# Patient Record
Sex: FEMALE | Race: WHITE | NOT HISPANIC OR LATINO | Employment: OTHER | URBAN - METROPOLITAN AREA
[De-identification: names, ages, dates, MRNs, and addresses within clinical notes are randomized per-mention and may not be internally consistent; named-entity substitution may affect disease eponyms.]

---

## 2017-01-09 ENCOUNTER — HOSPITAL ENCOUNTER (OUTPATIENT)
Dept: RADIOLOGY | Facility: HOSPITAL | Age: 82
Discharge: HOME | End: 2017-01-09
Attending: INTERNAL MEDICINE
Payer: MEDICARE

## 2017-01-09 DIAGNOSIS — R93.89 ABNORMAL CHEST X-RAY: ICD-10-CM

## 2017-01-09 PROCEDURE — 71250 CT THORAX DX C-: CPT | Mod: TC

## 2017-01-09 PROCEDURE — 71250 CT THORAX DX C-: CPT | Mod: 26,,, | Performed by: RADIOLOGY

## 2017-01-17 RX ORDER — TIOTROPIUM BROMIDE 18 UG/1
18 CAPSULE ORAL; RESPIRATORY (INHALATION) DAILY
Qty: 90 CAPSULE | Refills: 3 | Status: SHIPPED | OUTPATIENT
Start: 2017-01-17

## 2017-01-17 RX ORDER — FLUTICASONE PROPIONATE AND SALMETEROL 100; 50 UG/1; UG/1
1 POWDER RESPIRATORY (INHALATION) 2 TIMES DAILY
Qty: 180 EACH | Refills: 3 | Status: SHIPPED | OUTPATIENT
Start: 2017-01-17

## 2017-01-20 ENCOUNTER — DOCUMENTATION ONLY (OUTPATIENT)
Dept: FAMILY MEDICINE | Facility: CLINIC | Age: 82
End: 2017-01-20

## 2017-01-20 NOTE — PROGRESS NOTES
Pre-Visit Chart Review  For Appointment Scheduled on 1/23/17.    Health Maintenance Due   Topic Date Due    TETANUS VACCINE  01/11/1950    DEXA SCAN  01/11/1972    Zoster Vaccine  01/11/1992

## 2017-01-23 ENCOUNTER — OFFICE VISIT (OUTPATIENT)
Dept: FAMILY MEDICINE | Facility: CLINIC | Age: 82
End: 2017-01-23
Payer: MEDICARE

## 2017-01-23 VITALS
DIASTOLIC BLOOD PRESSURE: 75 MMHG | TEMPERATURE: 98 F | BODY MASS INDEX: 16.68 KG/M2 | HEIGHT: 64 IN | WEIGHT: 97.69 LBS | HEART RATE: 97 BPM | SYSTOLIC BLOOD PRESSURE: 110 MMHG

## 2017-01-23 DIAGNOSIS — R68.89 COLD INTOLERANCE: ICD-10-CM

## 2017-01-23 DIAGNOSIS — E78.2 HYPERLIPIDEMIA, MIXED: Primary | ICD-10-CM

## 2017-01-23 DIAGNOSIS — R73.9 HYPERGLYCEMIA: ICD-10-CM

## 2017-01-23 DIAGNOSIS — R63.6 UNDERWEIGHT: ICD-10-CM

## 2017-01-23 DIAGNOSIS — E61.1 LOW IRON: ICD-10-CM

## 2017-01-23 PROCEDURE — 99999 PR PBB SHADOW E&M-EST. PATIENT-LVL IV: CPT | Mod: PBBFAC,,, | Performed by: FAMILY MEDICINE

## 2017-01-23 PROCEDURE — 99214 OFFICE O/P EST MOD 30 MIN: CPT | Mod: PBBFAC,PO | Performed by: FAMILY MEDICINE

## 2017-01-23 PROCEDURE — 99214 OFFICE O/P EST MOD 30 MIN: CPT | Mod: S$PBB,,, | Performed by: FAMILY MEDICINE

## 2017-01-23 RX ORDER — FERROUS SULFATE 325(65) MG
325 TABLET ORAL DAILY
COMMUNITY

## 2017-01-23 RX ORDER — MOMETASONE FUROATE 50 UG/1
2 SPRAY, METERED NASAL DAILY PRN
Qty: 51 G | Refills: 3 | Status: SHIPPED | OUTPATIENT
Start: 2017-01-23

## 2017-01-23 RX ORDER — ATORVASTATIN CALCIUM 10 MG/1
TABLET, FILM COATED ORAL
Qty: 90 TABLET | Refills: 3 | Status: SHIPPED | OUTPATIENT
Start: 2017-01-23

## 2017-01-23 RX ORDER — ASCORBIC ACID 500 MG
500 TABLET ORAL DAILY
COMMUNITY

## 2017-01-23 RX ORDER — TRIAMCINOLONE ACETONIDE 1 MG/G
OINTMENT TOPICAL 2 TIMES DAILY
Qty: 30 G | Refills: 3 | Status: SHIPPED | OUTPATIENT
Start: 2017-01-23 | End: 2017-11-10

## 2017-01-23 NOTE — PROGRESS NOTES
CHIEF COMPLAINT:  Follow up      HISTORY OF PRESENT ILLNESS:  Lola Frazier is a 85 y.o. female who presents to clinic for follow up    1.  Hyperlipidemia: on lipitor. She denies any myalgia, dark colored urine. She is due for labs.     2. She has noticed cold intolerance.  She has low iron and is on iron supplements.     3. She has evidence of hyperglycemia, is due for repeat labs.    4. For the last month she has noticed dry itchy skin over the dorsal surface of her left hand. She has been applying hydrocortisone with some improvement.     REVIEW OF SYSTEMS:  The patient denies any fever, chills, night sweats, headaches, vision changes, difficulty speaking or swallowing, decreased hearing, weight loss, weight gain, chest pain, palpitations, shortness of breath, cough, nausea, vomiting, abdominal pain, dysuria, diarrhea, constipation, hematuria, hematochezia, melena, changes in her hair,nails, numbness or weakness in her extremities, erythema, pain or swelling over any of her joints, myalgias, swollen glands, easy bruising, fatigue, edema, symptoms of anxiety or depression.       MEDICATIONS:   Reviewed and/or reconciled in EPIC    ALLERGIES:  Reviewed and/or reconciled in [facility]    PAST MEDICAL/SURGICAL HISTORY:   Past Medical History   Diagnosis Date    Blindness of left eye     Cataract     COPD (chronic obstructive pulmonary disease)     Glaucoma       Past Surgical History   Procedure Laterality Date    Cataract extraction Bilateral     Hysterectomy         FAMILY HISTORY:    Family History   Problem Relation Age of Onset    No Known Problems Mother     Heart attack Father     Cancer Neg Hx     Heart disease Neg Hx        SOCIAL HISTORY:    Social History     Social History    Marital status:      Spouse name: N/A    Number of children: N/A    Years of education: N/A     Occupational History    Not on file.     Social History Main Topics    Smoking status: Current Every Day Smoker      "Packs/day: 0.25     Years: 66.00     Types: Cigarettes    Smokeless tobacco: Never Used      Comment: down to 2 cigatettes a day (8/22/16)    Alcohol use No    Drug use: No    Sexual activity: Not Currently     Partners: Male     Other Topics Concern    Not on file     Social History Narrative       PHYSICAL EXAM:  VITAL SIGNS:   Vitals:    01/23/17 1055   BP: 110/75   Pulse: 97   Temp: 97.7 °F (36.5 °C)   TempSrc: Oral   Weight: 44.3 kg (97 lb 10.6 oz)   Height: 5' 4" (1.626 m)     GENERAL:  Patient appears well nourished, sitting on exam table, in no acute distress.  HEENT:  Atraumatic, normocephalic, PERRLA, EOMI, no conjunctival injection, sclerae are anicteric, normal external auditory canals,TMs clear b/l, gross hearing intact to whisper, MMM, no oropharygneal erythema or exudate.  NECK:  Supple, normal ROM, trachea is midline , no supraclavicular or cervical LAD or masses palpated.  CARDIOVASCULAR:  RRR, normal S1 and S2, no m/r/g.  RESPIRATORY:  CTA b/l, no wheezes, rhonchi, rales.  No increased work of breathing, no  use of accessory muscles.  ABDOMEN:  Soft, nontender, nondistended, normoactive bowel sounds in all four quadrants, no rebound or guarding, no HSM or masses palpated.  Normal percussion.  EXTREMITIES:  2+ DP pulses b/l, no edema.  SKIN:  Warm, no lesions on exposed skin.  NEUROMUSCULAR:  Cranial nerves II-XII grossly intact.   No clubbing or cyanosis of digits/nails.  Steady gait.  PSYCH:  Patient is alert and oriented to person, time, place. They are appropriately dressed and groomed. There is normal eye contact. Rate and tone of speech is normal. Normal insight, judgement. Normal thought content and process.       ASSESSMENT/PLAN: This is a 85 y.o. female who presents to clinic to establish care  1. Hyperlipidemia: CMP, lipid panel  2. Low iron : CBC, iron, TIBC, ferritin  3. Cold intolerance: TSH, CBC, iron studies  4. Hyperglycemia: FBG, Hga1c  5. Underweight: see below  6. ichy skin: " place on triamcinolone ointment BID    Patient readiness: acceptance and barriers:none    During the course of the visit the patient was educated and counseled about the following:     Underweight:  Nutritional supplements and Follow up and re-weight in: 6  months and as needed.     Goals: Underweight: Increase calorie intake and BMI      Did patient meet goals/outcomes: No    The following self management tools provided: declined    Patient Instructions (the written plan) was given to the patient/family.     Time spent with patient: 30 minutes      FOLLOW UP:  6 months      Yara Meléndez MD

## 2017-01-23 NOTE — MR AVS SNAPSHOT
[facility]  [address] [place] [address]  Phone: [phone]  Fax: [phone]                  Lola COLBY Will   2017 10:40 AM   Office Visit    Description:  Female : 1932   Provider:  Yara Meléndez MD   Department:  [facility]           Reason for Visit     Follow-up           Diagnoses this Visit        Comments    Hyperlipidemia, mixed    -  Primary     Adult BMI <19 kg/sq m         Underweight         Hyperglycemia         Cold intolerance         Low iron                To Do List           Future Appointments        Provider Department Dept Phone    2017 8:45 AM LAB [facility] - Lab [phone]    2017 1:20 PM Yara Meléndez MD [facility] [phone]      Goals (5 Years of Data)     None      Follow-Up and Disposition     Return in about 6 months (around 2017) for hyperlipidemia.    Follow-up and Disposition History       These Medications        Disp Refills Start End    atorvastatin (LIPITOR) 10 MG tablet 90 tablet 3 2017     take 1 tablet by mouth once daily    Pharmacy: [facility]  [place]  [address]  [facility] Ph #: [phone]       mometasone (NASONEX) 50 mcg/actuation nasal spray 51 g 3 2017     2 sprays by Nasal route daily as needed. - Nasal    Pharmacy: [facility] [place]  [address]  [facility] Ph #: [phone]       triamcinolone acetonide 0.1% (KENALOG) 0.1 % ointment 30 g 3 2017    Apply topically 2 (two) times daily. - Topical (Top)    Pharmacy: [facility] [place] [place]   [address]  [facility] Ph #: [phone]         [facility]     [facility] On Call Nurse Care Line -  Assistance  Registered nurses in the [facility] On Call Center provide clinical advisement, health education, appointment booking, and other advisory services.  Call for this free service at  [phone].             Medications           Message regarding Medications     Verify the changes and/or additions to your medication regime listed below are the same as discussed with your clinician today.  If any of these changes or additions are incorrect, please notify your healthcare provider.        START taking these NEW medications        Refills    triamcinolone acetonide 0.1% (KENALOG) 0.1 % ointment 3    Sig: Apply topically 2 (two) times daily.    Class: Print    Route: Topical (Top)      CHANGE how you are taking these medications     Start Taking Instead of    mometasone (NASONEX) 50 mcg/actuation nasal spray mometasone (NASONEX) 50 mcg/actuation nasal spray    Dosage:  2 sprays by Nasal route daily as needed. Dosage:  2 sprays by Nasal route once daily.    Reason for Change:  Reorder            Verify that the below list of medications is an accurate representation of the medications you are currently taking.  If none reported, the list may be blank. If incorrect, please contact your healthcare provider. Carry this list with you in case of emergency.           Current Medications     albuterol (ACCUNEB) 0.63 mg/3 mL Nebu Take 3 mLs (0.63 mg total) by nebulization every 6 (six) hours as needed.    ascorbic acid, vitamin C, (VITAMIN C) 500 MG tablet Take 500 mg by mouth once daily.    aspirin 81 MG Chew Take 1 tablet (81 mg total) by mouth once daily.    atorvastatin (LIPITOR) 10 MG tablet take 1 tablet by mouth once daily    b complex vitamins tablet Take 1 tablet by mouth once daily.    calcium carbonate (OS-TANYA) 600 mg (1,500 mg) Tab Take 600 mg by mouth once daily.    ferrous sulfate 325 mg (65 mg iron) Tab tablet Take 325 mg by mouth once daily.    fish oil-omega-3 fatty acids 300-1,000 mg capsule Take 2 g by mouth once daily.    fluticasone-salmeterol 100-50 mcg/dose (ADVAIR) 100-50 mcg/dose diskus inhaler Inhale 1 puff into the lungs 2 (two) times daily.    glucosamine-chondroitin 500-400 mg  "tablet Take 1 tablet by mouth 2 (two) times daily.     ipratropium (ATROVENT) 0.02 % nebulizer solution Take 2.5 mLs (500 mcg total) by nebulization 3 (three) times daily as needed for Wheezing.    LUTEIN ORAL Take 1 tablet by mouth once daily.    mometasone (NASONEX) 50 mcg/actuation nasal spray 2 sprays by Nasal route daily as needed.    SIMBRINZA 1-0.2 % DrpS     tafluprost, PF, 0.0015 % Dpet Apply to eye.    tiotropium (SPIRIVA) 18 mcg inhalation capsule Inhale 1 capsule (18 mcg total) into the lungs once daily.    VIT A/VIT C/VIT E/ZINC/COPPER (PRESERVISION AREDS ORAL) Take 1 tablet by mouth 2 (two) times daily.     triamcinolone acetonide 0.1% (KENALOG) 0.1 % ointment Apply topically 2 (two) times daily.           Clinical Reference Information           Vital Signs - Last Recorded  Most recent update: 1/23/2017 11:01 AM by Aliza Herrera MA    BP Pulse Temp Ht Wt BMI    110/75 97 97.7 °F (36.5 °C) (Oral) 5' 4" (1.626 m) 44.3 kg (97 lb 10.6 oz) 16.76 kg/m2      Blood Pressure          Most Recent Value    BP  110/75      Allergies as of 1/23/2017     Iodine And Iodide Containing Products      Immunizations Administered on Date of Encounter - 1/23/2017     None      Orders Placed During Today's Visit     Future Labs/Procedures Expected by Expires    CBC auto differential  1/23/2017 3/24/2018    Comprehensive metabolic panel  1/23/2017 3/24/2018    Ferritin  1/23/2017 1/23/2018    Hemoglobin A1c  1/23/2017 3/24/2018    Iron and TIBC  1/23/2017 1/23/2018    Lipid panel  1/23/2017 3/24/2018    TSH  1/23/2017 1/23/2018      Smoking Cessation     If you would like to quit smoking:   You may be eligible for free services if you are a [place] resident and started smoking cigarettes before September 1, 1988.  Call the Smoking Cessation [employer] ([facility]) toll free at (992) [phone] or ([phone].   Call 0-800-QUIT-NOW if you do not meet the above criteria.            "

## 2017-01-26 ENCOUNTER — LAB VISIT (OUTPATIENT)
Dept: LAB | Facility: HOSPITAL | Age: 82
End: 2017-01-26
Attending: FAMILY MEDICINE
Payer: MEDICARE

## 2017-01-26 DIAGNOSIS — N28.9 DECREASED RENAL FUNCTION: ICD-10-CM

## 2017-01-26 DIAGNOSIS — R91.8 OPACITY OF LUNG ON IMAGING STUDY: ICD-10-CM

## 2017-01-26 LAB
ANION GAP SERPL CALC-SCNC: 14 MMOL/L
BUN SERPL-MCNC: 20 MG/DL
CALCIUM SERPL-MCNC: 10.3 MG/DL
CHLORIDE SERPL-SCNC: 104 MMOL/L
CO2 SERPL-SCNC: 26 MMOL/L
CREAT SERPL-MCNC: 0.8 MG/DL
CREAT SERPL-MCNC: 0.8 MG/DL
EST. GFR  (AFRICAN AMERICAN): >60 ML/MIN/1.73 M^2
EST. GFR  (AFRICAN AMERICAN): >60 ML/MIN/1.73 M^2
EST. GFR  (NON AFRICAN AMERICAN): >60 ML/MIN/1.73 M^2
EST. GFR  (NON AFRICAN AMERICAN): >60 ML/MIN/1.73 M^2
GLUCOSE SERPL-MCNC: 87 MG/DL
POTASSIUM SERPL-SCNC: 4.1 MMOL/L
SODIUM SERPL-SCNC: 144 MMOL/L

## 2017-01-26 PROCEDURE — 80048 BASIC METABOLIC PNL TOTAL CA: CPT

## 2017-01-26 PROCEDURE — 36415 COLL VENOUS BLD VENIPUNCTURE: CPT | Mod: PO

## 2017-02-01 ENCOUNTER — TELEPHONE (OUTPATIENT)
Dept: FAMILY MEDICINE | Facility: CLINIC | Age: 82
End: 2017-02-01

## 2017-02-01 NOTE — TELEPHONE ENCOUNTER
Whoever schduled this patient's labs scheduled open labs from October that didn't need to be done instead of scheduling ronnie labs i ordered at her visit on 1/23.  She needs those labs scheduled and they need to be fasting.

## 2017-02-03 NOTE — TELEPHONE ENCOUNTER
Spoke to patient son and states he will have patient call on Monday 2/6/17 to schedule labs attempted to call patient phone ring busy and could not leave message on cell

## 2017-07-21 ENCOUNTER — DOCUMENTATION ONLY (OUTPATIENT)
Dept: FAMILY MEDICINE | Facility: CLINIC | Age: 82
End: 2017-07-21

## 2017-07-21 NOTE — PROGRESS NOTES
Pre-Visit Chart Review  For Appointment Scheduled on 7/24/17.    Health Maintenance Due   Topic Date Due    DEXA SCAN  01/11/1972    Zoster Vaccine  01/11/1992

## 2017-11-07 DIAGNOSIS — Z78.0 ASYMPTOMATIC MENOPAUSAL STATE: Primary | ICD-10-CM

## 2017-11-08 ENCOUNTER — TELEPHONE (OUTPATIENT)
Dept: FAMILY MEDICINE | Facility: CLINIC | Age: 82
End: 2017-11-08

## 2017-11-08 ENCOUNTER — DOCUMENTATION ONLY (OUTPATIENT)
Dept: FAMILY MEDICINE | Facility: CLINIC | Age: 82
End: 2017-11-08

## 2017-11-08 NOTE — TELEPHONE ENCOUNTER
Attempted to call patient's son x2 regarding appointment on 11/10/17 patient needs to go to Er  For eval and treatment

## 2017-11-08 NOTE — PROGRESS NOTES
Pre-Visit Chart Review  For Appointment Scheduled on 11/10/17.    Health Maintenance Due   Topic Date Due    DEXA SCAN  01/11/1972    Zoster Vaccine  01/11/1992    Influenza Vaccine  08/01/2017    Lipid Panel  08/03/2017

## 2017-11-10 ENCOUNTER — HOSPITAL ENCOUNTER (INPATIENT)
Facility: HOSPITAL | Age: 82
LOS: 4 days | Discharge: HOSPICE | DRG: 640 | End: 2017-11-14
Attending: EMERGENCY MEDICINE | Admitting: HOSPITALIST
Payer: MEDICARE

## 2017-11-10 ENCOUNTER — OFFICE VISIT (OUTPATIENT)
Dept: FAMILY MEDICINE | Facility: CLINIC | Age: 82
End: 2017-11-10
Payer: MEDICARE

## 2017-11-10 VITALS
SYSTOLIC BLOOD PRESSURE: 81 MMHG | WEIGHT: 84.19 LBS | TEMPERATURE: 98 F | HEIGHT: 64 IN | BODY MASS INDEX: 14.37 KG/M2 | DIASTOLIC BLOOD PRESSURE: 59 MMHG | RESPIRATION RATE: 28 BRPM | HEART RATE: 121 BPM | OXYGEN SATURATION: 86 %

## 2017-11-10 DIAGNOSIS — E86.0 DEHYDRATION: ICD-10-CM

## 2017-11-10 DIAGNOSIS — R00.0 TACHYCARDIA: ICD-10-CM

## 2017-11-10 DIAGNOSIS — R09.02 HYPOXIA: Primary | ICD-10-CM

## 2017-11-10 DIAGNOSIS — E86.0 DEHYDRATION: Primary | ICD-10-CM

## 2017-11-10 DIAGNOSIS — R06.02 SOB (SHORTNESS OF BREATH): ICD-10-CM

## 2017-11-10 DIAGNOSIS — I95.9 HYPOTENSION: ICD-10-CM

## 2017-11-10 DIAGNOSIS — R91.8 MASS OF RIGHT LUNG: ICD-10-CM

## 2017-11-10 DIAGNOSIS — E83.52 HYPERCALCEMIA: ICD-10-CM

## 2017-11-10 DIAGNOSIS — J44.9 CHRONIC OBSTRUCTIVE PULMONARY DISEASE, UNSPECIFIED COPD TYPE: ICD-10-CM

## 2017-11-10 DIAGNOSIS — R09.02 HYPOXIA: ICD-10-CM

## 2017-11-10 PROBLEM — E43 SEVERE MALNUTRITION: Status: ACTIVE | Noted: 2017-11-10

## 2017-11-10 LAB
ALBUMIN SERPL BCP-MCNC: 2.7 G/DL
ALP SERPL-CCNC: 109 U/L
ALT SERPL W/O P-5'-P-CCNC: 7 U/L
ANION GAP SERPL CALC-SCNC: 12 MMOL/L
AST SERPL-CCNC: 21 U/L
BACTERIA #/AREA URNS HPF: ABNORMAL /HPF
BASOPHILS # BLD AUTO: 0 K/UL
BASOPHILS NFR BLD: 0.2 %
BILIRUB SERPL-MCNC: 0.9 MG/DL
BILIRUB UR QL STRIP: NEGATIVE
BUN SERPL-MCNC: 24 MG/DL
CALCIUM SERPL-MCNC: 12.2 MG/DL
CAOX CRY URNS QL MICRO: ABNORMAL
CHLORIDE SERPL-SCNC: 99 MMOL/L
CLARITY UR: ABNORMAL
CO2 SERPL-SCNC: 29 MMOL/L
COLOR UR: YELLOW
CREAT SERPL-MCNC: 0.9 MG/DL
DIFFERENTIAL METHOD: ABNORMAL
EOSINOPHIL # BLD AUTO: 0 K/UL
EOSINOPHIL NFR BLD: 0.3 %
ERYTHROCYTE [DISTWIDTH] IN BLOOD BY AUTOMATED COUNT: 14.1 %
EST. GFR  (AFRICAN AMERICAN): >60 ML/MIN/1.73 M^2
EST. GFR  (NON AFRICAN AMERICAN): 58 ML/MIN/1.73 M^2
GLUCOSE SERPL-MCNC: 124 MG/DL
GLUCOSE UR QL STRIP: NEGATIVE
HCT VFR BLD AUTO: 40.5 %
HGB BLD-MCNC: 13.3 G/DL
HGB UR QL STRIP: NEGATIVE
KETONES UR QL STRIP: ABNORMAL
LEUKOCYTE ESTERASE UR QL STRIP: ABNORMAL
LYMPHOCYTES # BLD AUTO: 1 K/UL
LYMPHOCYTES NFR BLD: 8.9 %
MCH RBC QN AUTO: 31.3 PG
MCHC RBC AUTO-ENTMCNC: 32.7 G/DL
MCV RBC AUTO: 96 FL
MICROSCOPIC COMMENT: ABNORMAL
MONOCYTES # BLD AUTO: 0.7 K/UL
MONOCYTES NFR BLD: 6 %
NEUTROPHILS # BLD AUTO: 10 K/UL
NEUTROPHILS NFR BLD: 84.6 %
NITRITE UR QL STRIP: NEGATIVE
PH UR STRIP: 6 [PH] (ref 5–8)
PLATELET # BLD AUTO: 229 K/UL
PMV BLD AUTO: 10.3 FL
POTASSIUM SERPL-SCNC: 3.5 MMOL/L
PROT SERPL-MCNC: 7.8 G/DL
PROT UR QL STRIP: NEGATIVE
RBC # BLD AUTO: 4.24 M/UL
RBC #/AREA URNS HPF: 3 /HPF (ref 0–4)
SODIUM SERPL-SCNC: 140 MMOL/L
SP GR UR STRIP: >=1.03 (ref 1–1.03)
SQUAMOUS #/AREA URNS HPF: 42 /HPF
URN SPEC COLLECT METH UR: ABNORMAL
UROBILINOGEN UR STRIP-ACNC: 1 EU/DL
WBC # BLD AUTO: 11.8 K/UL
WBC #/AREA URNS HPF: 22 /HPF (ref 0–5)

## 2017-11-10 PROCEDURE — 25000003 PHARM REV CODE 250: Performed by: EMERGENCY MEDICINE

## 2017-11-10 PROCEDURE — 94640 AIRWAY INHALATION TREATMENT: CPT

## 2017-11-10 PROCEDURE — 99284 EMERGENCY DEPT VISIT MOD MDM: CPT | Mod: 25,27

## 2017-11-10 PROCEDURE — 12000002 HC ACUTE/MED SURGE SEMI-PRIVATE ROOM

## 2017-11-10 PROCEDURE — 96361 HYDRATE IV INFUSION ADD-ON: CPT

## 2017-11-10 PROCEDURE — 25000003 PHARM REV CODE 250: Performed by: HOSPITALIST

## 2017-11-10 PROCEDURE — 85025 COMPLETE CBC W/AUTO DIFF WBC: CPT

## 2017-11-10 PROCEDURE — 99214 OFFICE O/P EST MOD 30 MIN: CPT | Mod: S$PBB,,, | Performed by: FAMILY MEDICINE

## 2017-11-10 PROCEDURE — 99999 PR PBB SHADOW E&M-EST. PATIENT-LVL IV: CPT | Mod: PBBFAC,,, | Performed by: FAMILY MEDICINE

## 2017-11-10 PROCEDURE — 36415 COLL VENOUS BLD VENIPUNCTURE: CPT

## 2017-11-10 PROCEDURE — 94761 N-INVAS EAR/PLS OXIMETRY MLT: CPT

## 2017-11-10 PROCEDURE — 96360 HYDRATION IV INFUSION INIT: CPT

## 2017-11-10 PROCEDURE — 99214 OFFICE O/P EST MOD 30 MIN: CPT | Mod: PBBFAC,25,PO | Performed by: FAMILY MEDICINE

## 2017-11-10 PROCEDURE — 80053 COMPREHEN METABOLIC PANEL: CPT

## 2017-11-10 PROCEDURE — 81000 URINALYSIS NONAUTO W/SCOPE: CPT

## 2017-11-10 PROCEDURE — 25000242 PHARM REV CODE 250 ALT 637 W/ HCPCS: Performed by: HOSPITALIST

## 2017-11-10 PROCEDURE — 63600175 PHARM REV CODE 636 W HCPCS: Performed by: NURSE PRACTITIONER

## 2017-11-10 PROCEDURE — 25000003 PHARM REV CODE 250: Performed by: NURSE PRACTITIONER

## 2017-11-10 RX ORDER — TIOTROPIUM BROMIDE 18 UG/1
18 CAPSULE ORAL; RESPIRATORY (INHALATION) DAILY
Status: DISCONTINUED | OUTPATIENT
Start: 2017-11-11 | End: 2017-11-10 | Stop reason: CLARIF

## 2017-11-10 RX ORDER — NAPROXEN SODIUM 220 MG/1
81 TABLET, FILM COATED ORAL DAILY
Status: DISCONTINUED | OUTPATIENT
Start: 2017-11-11 | End: 2017-11-14 | Stop reason: HOSPADM

## 2017-11-10 RX ORDER — FERROUS SULFATE 325(65) MG
325 TABLET, DELAYED RELEASE (ENTERIC COATED) ORAL DAILY
Status: DISCONTINUED | OUTPATIENT
Start: 2017-11-11 | End: 2017-11-14 | Stop reason: HOSPADM

## 2017-11-10 RX ORDER — IPRATROPIUM BROMIDE AND ALBUTEROL SULFATE 2.5; .5 MG/3ML; MG/3ML
3 SOLUTION RESPIRATORY (INHALATION) EVERY 6 HOURS PRN
Status: DISCONTINUED | OUTPATIENT
Start: 2017-11-10 | End: 2017-11-14 | Stop reason: HOSPADM

## 2017-11-10 RX ORDER — SODIUM CHLORIDE 9 MG/ML
INJECTION, SOLUTION INTRAVENOUS CONTINUOUS
Status: DISCONTINUED | OUTPATIENT
Start: 2017-11-10 | End: 2017-11-12

## 2017-11-10 RX ORDER — IPRATROPIUM BROMIDE 0.5 MG/2.5ML
0.5 SOLUTION RESPIRATORY (INHALATION) EVERY 6 HOURS
Status: DISCONTINUED | OUTPATIENT
Start: 2017-11-10 | End: 2017-11-14 | Stop reason: HOSPADM

## 2017-11-10 RX ORDER — FLUTICASONE FUROATE AND VILANTEROL 100; 25 UG/1; UG/1
1 POWDER RESPIRATORY (INHALATION) DAILY
Status: DISCONTINUED | OUTPATIENT
Start: 2017-11-11 | End: 2017-11-14 | Stop reason: HOSPADM

## 2017-11-10 RX ADMIN — IPRATROPIUM BROMIDE 0.5 MG: 0.5 SOLUTION RESPIRATORY (INHALATION) at 07:11

## 2017-11-10 RX ADMIN — SODIUM CHLORIDE: 0.9 INJECTION, SOLUTION INTRAVENOUS at 01:11

## 2017-11-10 RX ADMIN — PAMIDRONATE DISODIUM 90 MG: 9 INJECTION, SOLUTION INTRAVENOUS at 11:11

## 2017-11-10 RX ADMIN — SODIUM CHLORIDE: 0.9 INJECTION, SOLUTION INTRAVENOUS at 03:11

## 2017-11-10 RX ADMIN — SODIUM CHLORIDE 1000 ML: 0.9 INJECTION, SOLUTION INTRAVENOUS at 12:11

## 2017-11-10 RX ADMIN — SODIUM CHLORIDE: 0.9 INJECTION, SOLUTION INTRAVENOUS at 11:11

## 2017-11-10 NOTE — PROGRESS NOTES
Pt arrived to the floor from the ER. No acute distress noted and VS stable. MD aware of pt's arrival. Pt oriented to the room and call light in reach. Will continue to monitor.

## 2017-11-10 NOTE — PLAN OF CARE
Cm entered pt's room with son (Rob) and daughter-in-law (Ana) at bedside and they helped completed the assessment.  Pt came from Dr. Meléndez's office.  Pt lives alone in an apartment. PCP is Dr. Meléndez.  Pt denies diabetes, dialysis and coumadin.  Pt has home O2 and nebulizer at home.  Insurance verified as Medicare and  for life.  Disposition:  Pt will discharge to home. Pt can benefit with HH/PT.       11/10/17 8955   Discharge Assessment   Assessment Type Discharge Planning Assessment   Confirmed/corrected address and phone number on facesheet? Yes   Assessment information obtained from? Patient   Prior to hospitilization cognitive status: Alert/Oriented   Prior to hospitalization functional status: Independent;Assistive Equipment   Current cognitive status: Alert/Oriented   Current Functional Status: Independent;Assistive Equipment   Facility Arrived From: Dr. Meléndez's office   Lives With alone   Able to Return to Prior Arrangements yes   Is patient able to care for self after discharge? Yes   Who are your caregiver(s) and their phone number(s)? SonCandy Wtit - [phone]   Patient's perception of discharge disposition home or selfcare   Readmission Within The Last 30 Days no previous admission in last 30 days   Patient currently being followed by outpatient case management? No   Patient currently receives any other outside agency services? No   Equipment Currently Used at Home rollator;oxygen;respiratory supplies;nebulizer;grab bar;cane, quad   Do you have any problems affording any of your prescribed medications? No   Is the patient taking medications as prescribed? yes  ([facility])   Does the patient have transportation home? Yes   Transportation Available family or friend will provide   Dialysis Name and Scheduled days no   Does the patient receive services at the Coumadin Clinic? No   Discharge Plan A Home;Home Health   Discharge Plan B Home;Home Health   Patient/Family  In Agreement With Plan yes

## 2017-11-10 NOTE — PLAN OF CARE
Cm attempted to complete the assessment.  Nurse in the room doing assessment.       11/10/17 1522   Discharge Assessment   Assessment Type Discharge Planning Assessment

## 2017-11-10 NOTE — ED PROVIDER NOTES
"Encounter Date: 11/10/2017    SCRIBE #1 NOTE: ITanika, am scribing for, and in the presence of, Dr. Vivas.       History     Chief Complaint   Patient presents with    Shortness of Breath     Gradually worsening x 2 weeks.     11/10/2017  12:08 PM     Chief Complaint: SOB    The patient is a 85 y.o. female with PMHx of COPD, cataract, glaucoma and left ey blindness who presents with shortness of breath. Patient c/o gradual onset of progressively worsening shortness of breath for the past 4 weeks. Pt has associated mild dry cough which is chronic and decreased appetite. Pt states she does not want to eat and only intakes fluids. No recent fevers, cp, abdominal pain, vomiting or diarrhea. The patient had decreased oxygen saturation in the office and was hypoxia in ED triage. She was sent from Dr. Meléndez for workup and possible admission. Patient has reportedly lost 20 pounds in the past year. Shx of hysterectomy and cataract extraction.      The history is provided by the patient and the spouse.     Review of patient's allergies indicates:   Allergen Reactions    Iodine and iodide containing products Anaphylaxis     " I die"     Past Medical History:   Diagnosis Date    Blindness of left eye     Cataract     COPD (chronic obstructive pulmonary disease)     Glaucoma      Past Surgical History:   Procedure Laterality Date    CATARACT EXTRACTION Bilateral     HYSTERECTOMY       Family History   Problem Relation Age of Onset    No Known Problems Mother     Heart attack Father     Cancer Neg Hx     Heart disease Neg Hx      Social History   Substance Use Topics    Smoking status: Former Smoker     Packs/day: 0.25     Years: 66.00     Types: Cigarettes     Quit date: 10/10/2017    Smokeless tobacco: Never Used      Comment: down to 2 cigatettes a day (8/22/16)    Alcohol use No     Review of Systems   Constitutional: Positive for appetite change and unexpected weight change. Negative for chills " and fever.   HENT: Negative for congestion, rhinorrhea and sore throat.    Respiratory: Positive for cough and shortness of breath.    Cardiovascular: Negative for chest pain.   Gastrointestinal: Negative for abdominal pain, diarrhea, nausea and vomiting.   Genitourinary: Negative for dysuria.   Musculoskeletal: Negative for back pain and myalgias.   Skin: Negative for rash.   Neurological: Negative for weakness and numbness.   Hematological: Does not bruise/bleed easily.   All other systems reviewed and are negative.      Physical Exam     Initial Vitals   BP Pulse Resp Temp SpO2   11/10/17 1150 11/10/17 1150 11/10/17 1150 11/10/17 1150 11/10/17 1153   (!) 89/50 88 14 97.5 °F (36.4 °C) 96 %      MAP       11/10/17 1150       63         Physical Exam    Nursing note and vitals reviewed.  HENT:   Head: Normocephalic and atraumatic.   Mouth/Throat: Mucous membranes are dry (extremely).   Cardiovascular: Regular rhythm, normal heart sounds, intact distal pulses and normal pulses. Tachycardia present.  Exam reveals no gallop and no friction rub.    No murmur heard.  Pulses:       Radial pulses are 2+ on the right side, and 2+ on the left side.        Dorsalis pedis pulses are 2+ on the right side, and 2+ on the left side.        Posterior tibial pulses are 2+ on the right side, and 2+ on the left side.   Pulmonary/Chest: She has no wheezes. She has no rhonchi. She has no rales.   Diminished breath sounds.   Abdominal: Soft. She exhibits no distension. There is no tenderness.   Neurological: She is alert and oriented to person, place, and time.   Skin: Skin is dry. No rash noted.   Psychiatric: She has a normal mood and affect.         ED Course   Procedures  Labs Reviewed - No data to display          Medical Decision Making:   Initial Assessment:   85-year-old female presented with a chief complaint of shortness of breath.  Differential Diagnosis:   Initial differential diagnosis included but not limited to Pneumonia,  sepsis, dehydration, lung cancer.  Clinical Tests:   Lab Tests: Ordered and Reviewed  Radiological Study: Ordered and Reviewed  ED Management:  The patient was emergently evaluated in the emergency Department, her evaluation was significant for an elderly female with extremely dry mucous membranes.  The patient appears dehydrated.  The patient's labs were significant for hypercalcemia.  The patient's x-ray does show worsening of her right lung mass.  The patient's diagnosis is dehydration, hypercalcemia, and likely lung cancer.  I will admit her to the hospitalist service for further care and treatment.  I've discussed the case with the hospitalist on-call, Dr. Sousa.  He has accepted the patient for admission.  The patient was treated in the emergency Department with an IV fluid bolus.               I, Dr. Tyrone Vivas, personally performed the services described in this documentation. All medical record entries made by the scribe were at my direction and in my presence.  I have reviewed the chart and agree that the record reflects my personal performance and is accurate and complete. Tyrone Viavs MD.  5:10 PM 11/10/2017       ED Course      Clinical Impression:   The primary encounter diagnosis was Hypoxia. Diagnoses of SOB (shortness of breath), Dehydration, and Hypercalcemia were also pertinent to this visit.                           Tyrone Vivas MD  11/10/17 1681

## 2017-11-11 ENCOUNTER — OUTSIDE PLACE OF SERVICE (OUTPATIENT)
Dept: PULMONOLOGY | Facility: CLINIC | Age: 82
End: 2017-11-11
Payer: MEDICARE

## 2017-11-11 LAB
ANION GAP SERPL CALC-SCNC: 10 MMOL/L
BUN SERPL-MCNC: 14 MG/DL
CALCIUM SERPL-MCNC: 10.5 MG/DL
CHLORIDE SERPL-SCNC: 105 MMOL/L
CO2 SERPL-SCNC: 28 MMOL/L
CREAT SERPL-MCNC: 0.7 MG/DL
EST. GFR  (AFRICAN AMERICAN): >60 ML/MIN/1.73 M^2
EST. GFR  (NON AFRICAN AMERICAN): >60 ML/MIN/1.73 M^2
GLUCOSE SERPL-MCNC: 80 MG/DL
POTASSIUM SERPL-SCNC: 3.8 MMOL/L
PTH-INTACT SERPL-MCNC: 12.9 PG/ML
SODIUM SERPL-SCNC: 143 MMOL/L

## 2017-11-11 PROCEDURE — 25000242 PHARM REV CODE 250 ALT 637 W/ HCPCS: Performed by: HOSPITALIST

## 2017-11-11 PROCEDURE — 36415 COLL VENOUS BLD VENIPUNCTURE: CPT

## 2017-11-11 PROCEDURE — 94761 N-INVAS EAR/PLS OXIMETRY MLT: CPT

## 2017-11-11 PROCEDURE — 94640 AIRWAY INHALATION TREATMENT: CPT

## 2017-11-11 PROCEDURE — 12000002 HC ACUTE/MED SURGE SEMI-PRIVATE ROOM

## 2017-11-11 PROCEDURE — 82397 CHEMILUMINESCENT ASSAY: CPT

## 2017-11-11 PROCEDURE — 25000003 PHARM REV CODE 250: Performed by: HOSPITALIST

## 2017-11-11 PROCEDURE — 97802 MEDICAL NUTRITION INDIV IN: CPT

## 2017-11-11 PROCEDURE — 99222 1ST HOSP IP/OBS MODERATE 55: CPT | Mod: ,,, | Performed by: INTERNAL MEDICINE

## 2017-11-11 PROCEDURE — 25000003 PHARM REV CODE 250: Performed by: INTERNAL MEDICINE

## 2017-11-11 PROCEDURE — 80048 BASIC METABOLIC PNL TOTAL CA: CPT

## 2017-11-11 PROCEDURE — 83970 ASSAY OF PARATHORMONE: CPT

## 2017-11-11 RX ADMIN — ASPIRIN 81 MG CHEWABLE TABLET 81 MG: 81 TABLET CHEWABLE at 08:11

## 2017-11-11 RX ADMIN — IPRATROPIUM BROMIDE 0.5 MG: 0.5 SOLUTION RESPIRATORY (INHALATION) at 07:11

## 2017-11-11 RX ADMIN — SODIUM CHLORIDE 250 ML: 0.9 INJECTION, SOLUTION INTRAVENOUS at 08:11

## 2017-11-11 RX ADMIN — IPRATROPIUM BROMIDE 0.5 MG: 0.5 SOLUTION RESPIRATORY (INHALATION) at 01:11

## 2017-11-11 RX ADMIN — FLUTICASONE FUROATE AND VILANTEROL TRIFENATATE 1 PUFF: 100; 25 POWDER RESPIRATORY (INHALATION) at 07:11

## 2017-11-11 RX ADMIN — FERROUS SULFATE TAB EC 325 MG (65 MG FE EQUIVALENT) 325 MG: 325 (65 FE) TABLET DELAYED RESPONSE at 08:11

## 2017-11-11 RX ADMIN — IPRATROPIUM BROMIDE 0.5 MG: 0.5 SOLUTION RESPIRATORY (INHALATION) at 12:11

## 2017-11-11 NOTE — PLAN OF CARE
11/10/17 1949   Patient Assessment/Suction   Level of Consciousness (AVPU) alert   Respiratory Effort Normal;Unlabored   Expansion/Accessory Muscles/Retractions no use of accessory muscles   All Lung Fields Breath Sounds diminished   PRE-TX-O2-ETCO2   O2 Device (Oxygen Therapy) room air   SpO2 96 %   Pulse Oximetry Type Intermittent   $ Pulse Oximetry - Multiple Charge Pulse Oximetry - Multiple   Pulse 79   Resp 16   Aerosol Therapy   $ Aerosol Therapy Charges Aerosol Treatment   Respiratory Treatment Status given   SVN/Inhaler Treatment Route mask   Position During Treatment HOB at 45 degrees   Patient Tolerance good   Post-Treatment   Post-treatment Heart Rate (beats/min) 84   Post-treatment Resp Rate (breaths/min) 16   All Fields Breath Sounds aeration increased   Pt assessed, no distress noted. Pt receives Atrovent Q6, tols txs well.

## 2017-11-11 NOTE — PLAN OF CARE
Problem: Patient Care Overview  Goal: Plan of Care Review  Outcome: Ongoing (interventions implemented as appropriate)  No significant events noted. Pt up to bedside commode with standby assist. Bed alarm utilized. Tolerating diet but only eating ~25% of meals, likes boost though. Teds/scds on pt. IV fluids infusing. Calcium stable today. Safety maintained, no falls/ injury.

## 2017-11-11 NOTE — ASSESSMENT & PLAN NOTE
Related to (etiology):   Social/Environmental     Signs and Symptoms (as evidenced by):   Decreased PO intake >2months, wtloss of 14% [id number], BMI 14, 70% IBW, dehydration, anorexia/depression     Interventions/Recommendations (treatment strategy):  Regular diet with Dary Plus TID  Strong Encouragement PO Intake  Daily MVI  May consider Appetite Stimulant or PPN supplementation if PO intake continues <50%  See RD Note 11/11/17    Nutrition Diagnosis Status:   New

## 2017-11-11 NOTE — CONSULTS
Pulmonary/Critical Care Consult      Patient name: Lola Frazier  MRN: [id number]  Date: 11/11/2017    Admit Date: 11/10/2017  Consult Requested By: Luiz Masters MD    Reason for Consult: Lung cancer    HPI:    86 yo female, very poor historian, who doesn't know why she is in the hospital.  I was asked to see for lung cancer.  Pt does state that she has a lung mass ( seen a MD in [place] for this and refused any evaluation).  Admitted with generalized weakness and hypercalcemia.  ROS as below.  H/PEx reviewed.    Review of Systems    Review of Systems   Constitutional: Positive for malaise/fatigue and weight loss.   Respiratory: Positive for cough and shortness of breath.    Neurological: Positive for weakness.   Psychiatric/Behavioral: Positive for depression.        Decreased activity   All other systems reviewed and are negative.      Past Medical History    Past Medical History:   Diagnosis Date    Blindness of left eye     Cataract     COPD (chronic obstructive pulmonary disease)     Glaucoma     Hyperlipemia        Past Surgical History    Past Surgical History:   Procedure Laterality Date    CATARACT EXTRACTION Bilateral     HYSTERECTOMY         Medications (scheduled):      aspirin  81 mg Oral Daily    ferrous sulfate  325 mg Oral Daily    fluticasone-vilanterol  1 puff Inhalation Daily    ipratropium  0.5 mg Nebulization Q6H       Medications (infusions):      sodium chloride 0.9% 150 mL/hr at 11/10/17 2315       Medications (prn):     albuterol-ipratropium 2.5mg-0.5mg/3mL    Family History:   Family History   Problem Relation Age of Onset    No Known Problems Mother     Heart attack Father     Cancer Neg Hx     Heart disease Neg Hx        Social History: Tobacco:   History   Smoking Status    Former Smoker    Packs/day: 0.25    Years: 66.00    Types: Cigarettes    Quit date: 10/10/2017   Smokeless Tobacco    Never Used     Comment: down to 2 cigatettes a day (8/22/16)                "                 EtOH:   History   Alcohol Use No                                Drugs:   History   Drug Use No                                Occupation: retired                             Asbestos exposure: no    Physical Exam    Vital signs:  Temp:  [96.5 °F (35.8 °C)-98 °F (36.7 °C)]   Pulse:  []   Resp:  [14-28]   BP: ()/(50-67)   SpO2:  [86 %-100 %]     Intake/Output:   Intake/Output Summary (Last 24 hours) at 11/11/17 1006  Last data filed at 11/11/17 0600   Gross per 24 hour   Intake          4133.75 ml   Output                0 ml   Net          4133.75 ml        BMI: Estimated body mass index is 14.42 kg/m² as calculated from the following:    Height as of this encounter: 5' 4" (1.626 m).    Weight as of this encounter: 38.1 kg (84 lb).    Physical Exam   Constitutional: No distress.   Thin frail, confused   HENT:   Head: Normocephalic and atraumatic.   Temporal wasting   Eyes: Pupils are equal, round, and reactive to light.   Neck: Normal range of motion. Neck supple. No JVD present. No tracheal deviation present. No thyromegaly present.   Cardiovascular: Normal rate and regular rhythm.  Exam reveals no gallop and no friction rub.    No murmur heard.  Pulmonary/Chest: Effort normal and breath sounds normal. No stridor. No respiratory distress. She has no wheezes. She has no rales.   Shallow effort   Abdominal: Soft. Bowel sounds are normal. She exhibits no distension. There is no tenderness. There is no rebound.   scaphoid   Musculoskeletal: Normal range of motion. She exhibits no edema.   Neurological: She is alert.   Skin: Skin is warm. She is not diaphoretic.   Vitals reviewed.      Laboratory      Recent Labs  Lab 11/10/17  1223   WBC 11.80   RBC 4.24   HGB 13.3   HCT 40.5      MCV 96   MCH 31.3*   MCHC 32.7         Recent Labs  Lab 11/10/17  1223 11/11/17  0549   CALCIUM 12.2* 10.5   PROT 7.8  --     143   K 3.5 3.8   CO2 29 28   CL 99 105   BUN 24* 14   CREATININE 0.9 0.7 "   ALKPHOS 109  --    ALT 7*  --    AST 21  --    BILITOT 0.9  --        No results for input(s): INR, APTT in the last 24 hours.    Invalid input(s): PT    No results for input(s): CPK, CPKMB, TROPONINI, MB in the last 24 hours.    Additional labs: reviewed    Microbiology:       Microbiology Results (last 7 days)     ** No results found for the last 168 hours. **          Radiology    Imaging Results          CT Chest Without Contrast (In process)                X-Ray Chest 1 View (Final result)  Result time 11/10/17 12:35:42    Final result by Noah Castillo MD (11/10/17 12:35:42)                 Impression:      1.  Interval further increase in size of large right mid lung zone when compared to the prior studies, most likely representing a malignant mass such as a bronchogenic carcinoma. Probable right hilar lymphadenopathy.    2. Patchy linear and nodular opacities are also noted in each lung which are most compatible with indolent infection such as TRENTON, as noted previously.    Findings discussed with Dr. Vivas at 12:35 hours on 11/10/17.        Electronically signed by: Noah Castillo MD  Date:     11/10/17  Time:    12:35              Narrative:    Comparison: 11/9/17 and prior    Technique: Single AP portable chest radiograph.    Findings:There has been a further interval increase in size of right midlung zone mass when compared to CT  radiograph dated 1/9/17, most likely representing a malignant mass such as bronchogenic carcinoma. This measures up to approximately 7 cm transverse. There is pulmonary emphysema. Additional nodular opacities are identified in each upper and lower lung which most likely represent indolent infection given prior CT findings. No pneumothorax is seen. There is right hilar fullness which has increased compared to the 10/24/16 radiographs marked with an arrow.    No acute osseous abnormality is seen. There are healed rib fractures in the left upper posterior chest.                              RADIOLOGY REPORT (Final result)  Result time 11/11/17 09:22:06                Additional Studies    na    Ventilator Information              No results for input(s): PH, PCO2, PO2, HCO3, POCSATURATED, BE in the last 24 hours.    Impression/Plan      ICD-10-CM ICD-9-CM   1. Hypoxia R09.02 799.02   2. SOB (shortness of breath) R06.02 786.05   3. Dehydration E86.0 276.51   4. Hypercalcemia E83.52 275.42     Lung cancer  - pt with large mass which is almost certainly a lung cancer  - she is too frail to consider bronch or biopsy without significant risk  - her performance status is too poor to consider chemotherapy  - only possible treatment would be XRT  - realistically hospice is probably the most appropriate course  - agree with DNR  Hypoxemia  - better this AM  - continue with O2 as needed  H/o cigarette use  - aware  Probable COPD  - respiratory treatments  Hypercalcemia  - better today with rehydration  - may be related to underlying cancer  Dehydration  - better  Malnutrition  - needs dietary supplements      Thank you for this consult.  I will follow with you while the patient is hospitalized.  Please call ([phone]) if you have any questions.    Deon Jiménez MD

## 2017-11-11 NOTE — PLAN OF CARE
Problem: Nutrition, Imbalanced: Inadequate Oral Intake (Adult)  Intervention: Promote/Optimize Nutrition  Goals: increase PO intake to >50% of all meals and supplements and maintain wt by discharge  Nutrition Goal Status: new  Communication of RD Recs:  (POC reviewed)    Nutrition Discharge Planning: to be determined    Continuum of Care Plan Referral to Outpatient Services: home care

## 2017-11-11 NOTE — ASSESSMENT & PLAN NOTE
Possibly symptomatic.  Due to malignancy, dehydration, and Ca supplement.  Run saline at 150 ml/h.  Zometa 4 mg IV x 1.  Monitor Ca level.  PTH and PTHrp.

## 2017-11-11 NOTE — HPI
Late last year, she underwent imaging of her chest, which showed a spiculated mass in her right lung.  At the time, she didn't want any further testing or biopsy.  Didn't want any treatment for it.  Lives by herself.  Family brought her to Dr. Meléndez today.  For the past month or two, she's anorexic, sleeping all the time, has no energy, and is depressed.  Finds no marilu in things she used to like to do, such as sewing.  Has lost a lot of weight.  In [facility] office, patient was hypotensive and hypoxic.  Was referred to our ER.  No fevers or chills.  Gets short of breath with exertion.  Has chronic pains in her back.

## 2017-11-11 NOTE — H&P
"[facility] Hospital Medicine  History & Physical    Patient Name: Lola Frazier  MRN: [id number]  Admission Date: 11/10/2017  Attending Physician: Theodore Sousa MD   Primary Care Provider: Yara Meléndez MD         Patient information was obtained from patient, relative(s), past medical records and ER records.     Subjective:     Principal Problem:Hypercalcemia    Chief Complaint:   Chief Complaint   Patient presents with    Shortness of Breath     Gradually worsening x 2 weeks.        HPI: Late last year, she underwent imaging of her chest, which showed a spiculated mass in her right lung.  At the time, she didn't want any further testing or biopsy.  Didn't want any treatment for it.  Lives by herself.  Family brought her to Dr. Meléndez today.  For the past month or two, she's anorexic, sleeping all the time, has no energy, and is depressed.  Finds no marilu in things she used to like to do, such as sewing.  Has lost a lot of weight.  In [facility] office, patient was hypotensive and hypoxic.  Was referred to our ER.  No fevers or chills.  Gets short of breath with exertion.  Has chronic pains in her back.    Past Medical History:   Diagnosis Date    Blindness of left eye     Cataract     COPD (chronic obstructive pulmonary disease)     Glaucoma     Hyperlipemia        Past Surgical History:   Procedure Laterality Date    CATARACT EXTRACTION Bilateral     HYSTERECTOMY         Review of patient's allergies indicates:   Allergen Reactions    Iodine and iodide containing products Anaphylaxis     " I die"       No current facility-administered medications on file prior to encounter.      Current Outpatient Prescriptions on File Prior to Encounter   Medication Sig    albuterol (ACCUNEB) 0.63 mg/3 mL Nebu Take 3 mLs (0.63 mg total) by nebulization every 6 (six) hours as needed.    ascorbic acid, vitamin C, (VITAMIN C) 500 MG tablet Take 500 mg by mouth once daily.    aspirin 81 MG Chew " Take 1 tablet (81 mg total) by mouth once daily.    atorvastatin (LIPITOR) 10 MG tablet take 1 tablet by mouth once daily    b complex vitamins tablet Take 1 tablet by mouth once daily.    calcium carbonate (OS-TANYA) 600 mg (1,500 mg) Tab Take 600 mg by mouth once daily.    ferrous sulfate 325 mg (65 mg iron) Tab tablet Take 325 mg by mouth once daily.    fish oil-omega-3 fatty acids 300-1,000 mg capsule Take 2 g by mouth once daily.    fluticasone-salmeterol 100-50 mcg/dose (ADVAIR) 100-50 mcg/dose diskus inhaler Inhale 1 puff into the lungs 2 (two) times daily.    glucosamine-chondroitin 500-400 mg tablet Take 1 tablet by mouth 2 (two) times daily.     ipratropium (ATROVENT) 0.02 % nebulizer solution Take 2.5 mLs (500 mcg total) by nebulization 3 (three) times daily as needed for Wheezing.    LUTEIN ORAL Take 1 tablet by mouth once daily.    mometasone (NASONEX) 50 mcg/actuation nasal spray 2 sprays by Nasal route daily as needed.    SIMBRINZA 1-0.2 % DrpS     tafluprost, PF, 0.0015 % Dpet Apply to eye.    tiotropium (SPIRIVA) 18 mcg inhalation capsule Inhale 1 capsule (18 mcg total) into the lungs once daily.    VIT A/VIT C/VIT E/ZINC/COPPER (PRESERVISION AREDS ORAL) Take 1 tablet by mouth 2 (two) times daily.     [DISCONTINUED] triamcinolone acetonide 0.1% (KENALOG) 0.1 % ointment Apply topically 2 (two) times daily.     Family History     Problem Relation (Age of Onset)    Heart attack Father    No Known Problems Mother        Social History Main Topics    Smoking status: Former Smoker     Packs/day: 0.25     Years: 66.00     Types: Cigarettes     Quit date: 10/10/2017    Smokeless tobacco: Never Used      Comment: down to 2 cigatettes a day (8/22/16)    Alcohol use No    Drug use: No    Sexual activity: Not Currently     Partners: Male     Review of Systems   Constitutional: Positive for activity change, appetite change and fatigue. Negative for chills and fever.   Respiratory: Positive for  shortness of breath. Negative for cough.    Cardiovascular: Negative for chest pain and leg swelling.   Gastrointestinal: Negative for abdominal pain, nausea and vomiting.   Endocrine: Negative for cold intolerance.   Genitourinary: Negative for difficulty urinating and dysuria.   Musculoskeletal: Positive for back pain.   Neurological: Negative for tremors, seizures and light-headedness.   Psychiatric/Behavioral: Positive for confusion and dysphoric mood. Negative for hallucinations. The patient is not nervous/anxious.    All other systems reviewed and are negative.    Objective:     Vital Signs (Most Recent):  Temp: 97.4 °F (36.3 °C) (11/10/17 1540)  Pulse: 86 (11/10/17 1540)  Resp: 16 (11/10/17 1540)  BP: 107/66 (11/10/17 1540)  SpO2: 98 % (11/10/17 1555) Vital Signs (24h Range):  Temp:  [97.4 °F (36.3 °C)-97.5 °F (36.4 °C)] 97.4 °F (36.3 °C)  Pulse:  [] 86  Resp:  [14-28] 16  SpO2:  [86 %-100 %] 98 %  BP: ()/(50-67) 107/66     Weight: 38.1 kg (84 lb)  Body mass index is 14.42 kg/m².    Physical Exam   Constitutional: She is oriented to person, place, and time. Vital signs are normal. She appears cachectic. She is active. She has a sickly appearance.   HENT:   Head: Normocephalic and atraumatic.   Right Ear: External ear normal.   Left Ear: External ear normal.   Mouth/Throat: Mucous membranes are dry.   Eyes: Conjunctivae and EOM are normal.   Neck: Trachea normal and full passive range of motion without pain. Neck supple. No JVD present. No thyromegaly present.   Cardiovascular: Normal rate and regular rhythm.  PMI is not displaced.    No murmur heard.  Pulmonary/Chest: Effort normal and breath sounds normal. She exhibits no mass and no tenderness.   Abdominal: Soft. Normal appearance and bowel sounds are normal. There is no hepatosplenomegaly. There is no tenderness.   Musculoskeletal: Normal range of motion. She exhibits no edema.   Neurological: She is alert and oriented to person, place, and  time. She has normal strength. No cranial nerve deficit.   Skin: Skin is warm and dry. Capillary refill takes less than 2 seconds. No rash noted.   Psychiatric: She has a normal mood and affect. Her speech is normal. She is slowed. Cognition and memory are impaired.        Significant Labs:   BMP:   Recent Labs  Lab 11/10/17  1223   *      K 3.5   CL 99   CO2 29   BUN 24*   CREATININE 0.9   CALCIUM 12.2*     CBC:   Recent Labs  Lab 11/10/17  1223   WBC 11.80   HGB 13.3   HCT 40.5          Significant Imaging: I have reviewed all pertinent imaging results/findings within the past 24 hours.    Assessment/Plan:     * Hypercalcemia    Possibly symptomatic.  Due to malignancy, dehydration, and Ca supplement.  Run saline at 150 ml/h.  Zometa 4 mg IV x 1.  Monitor Ca level.  PTH and PTHrp.          Mass of right lung    CT chest.  Consult with pulmonologist.  Patient is not a candidate for systemic treatment.  Not a candidate for resection.          Severe malnutrition    Due to poor intake and malignancy.  Ensure meal replacement TID.  Consult dietician.            VTE Risk Mitigation         Ordered     Medium Risk of VTE  Once      11/10/17 1448     Place RADHA hose  Until discontinued      11/10/17 1448     Place sequential compression device  Until discontinued      11/10/17 1448         I spent 15 minutes of face to face discussion regarding advance directives and end of life planning which included patient and family. Patient and Family understand the seriousness of her condition and would like to make their end of life decisions known as follows-  Do not resuscitate or intubate.    Theodore Sousa MD  Department of Hospital Medicine   [facility]

## 2017-11-11 NOTE — CONSULTS
[facility]  Adult Nutrition  Consult Note    SUMMARY     Recommendations    Recommendation/Intervention: Continue Regular diet with Boost Plus as tolerated with strong encouragement of all intake. Consider adding daily MVI. Consider possible appetite stimulant or PPN supplementation if PO intake is continuously <50%. Site RD to monitor  Goals: increase PO intake to >50% of all meals and supplements and maintain wt by discharge  Nutrition Goal Status: new  Communication of RD Recs:  (POC reviewed)    Nutrition Discharge Planning: to be determined    Continuum of Care Plan    Referral to Outpatient Services: home care    Reason for Assessment    Reason for Assessment: physician consult  Diagnosis: other (see comments) (hypercalcemia)  Relevent Medical History: COPD          General Information Comments: Remote assessment completed. Pt found to have mass in right lung. Pt refusing further treatment at this time. Pt lives by herself with home O2, + wtloss of unknown amount, per H&P, pt with cachectic appearance    Nutrition Prescription Ordered    Current Diet Order: Regular  Nutrition Order Comments: no intake recorded     Oral Nutrition Supplement: Boost Plus TID     Evaluation of Received Nutrients/Fluid Intake    IV Fluid (mL): 3600     I/O: +4,133.8      Fluid Required: exceed needs     % Intake of Estimated Energy Needs: Other: Unknown  % Meal Intake: Other: Unknown     Nutrition Risk Screen     Nutrition Risk Screen: no indicators present    Nutrition/Diet History    Patient Reported Diet/Restrictions/Preferences: general     Food Preferences: no cultural or Ep[employer]opalian preferences        Factors Affecting Nutritional Intake: depression    Labs/Tests/Procedures/Meds    Diagnostic Test/Procedure Review: reviewed  Pertinent Labs Reviewed: reviewed     Pertinent Medications Reviewed: reviewed  Pertinent Medications Comments: Aspirin, Iron    Physical Findings    Overall Physical Appearance: weak,  "underweight (cachectic per H&P)  Tubes:  (-)  Oral/Mouth Cavity:  (UTO)  Skin: intact (per H&P)    Anthropometrics    Temp: 98 °F (36.7 °C)     Height: 5' 4"  Weight Method:  (RD reviewed)  Weight: 38.1 kg (83 lb 15.9 oz)     Ideal Body Weight (IBW), Female: 120 lb     % Ideal Body Weight, Female (lb): 70 lb  BMI (Calculated): 14.4  BMI Grade: less than 16 protein-energy malnutrition grade III  Weight Loss: unintentional     Weight Change Amount: 13 lb 10.7 oz (per chart review- since 1/23/17)    Estimated/Assessed Needs    Weight Used For Calorie Calculations: 38.1 kg (83 lb 15.9 oz) (IBW)   Height (cm): 162.6 cm  Energy Calorie Requirements (kcal): 7585-9949 (35-40)  Energy Need Method: Kcal/kg     35 kcal/kg (kcal): 1333.5 and 40 kcal/kg (kcal): 1524   RMR ([place]-St. Jeor Equation): 811        Weight Used For Protein Calculations: 38.1 kg (83 lb 15.9 oz)  Protein Requirements: 45-57 (1.2-1.5)  1.2 gm Protein (gm): 45.82 and 1.5 gm Protein (gm): 57.27     Fluid Need Method: RDA Method (1ml/kcal or per MD)     RDA Method (mL): 1333      Assessment and Plan    Severe malnutrition    Related to (etiology):   Social/Environmental     Signs and Symptoms (as evidenced by):   Decreased PO intake >2months, wtloss of 14% [id number], BMI 14, 70% IBW, dehydration, anorexia/depression     Interventions/Recommendations (treatment strategy):  Regular diet with Dary Plus TID  Strong Encouragement PO Intake  Daily MVI  May consider Appetite Stimulant or PPN supplementation if PO intake continues <50%  See RD Note 11/11/17    Nutrition Diagnosis Status:   New              Monitor and Evaluation    Food and Nutrient Intake: food and beverage intake, energy intake  Food and Nutrient Adminstration: diet order  Knowledge/Beliefs/Attitudes: food and nutrition knowledge/skill, beliefs and attitudes  Physical Activity and Function: nutrition-related ADLs and IADLs  Anthropometric Measurements: weight, weight change  Biochemical Data, " Medical Tests and Procedures: electrolyte and renal panel, gastrointestinal profile, glucose/endocrine profile, inflammatory profile, lipid profile  Nutrition-Focused Physical Findings: overall appearance, head and eyes, extremities, muscles and bones, skin    Nutrition Risk    Level of Risk:  (F/U 2x/wk)    Nutrition Follow-Up    RD Follow-up?: Yes

## 2017-11-11 NOTE — PROGRESS NOTES
10:00 AM  Consult to pulmonology called in to Pulmonologist on call- Dr Jiménez's answering service- spoke w/ Katherine.

## 2017-11-11 NOTE — PLAN OF CARE
Problem: Patient Care Overview  Goal: Plan of Care Review  Outcome: Ongoing (interventions implemented as appropriate)  POC reviewed with patient. Verbalized understanding. Patient had an uneventful night. Patient denies pain during shift. No complaints of SOB.  IV fluids infusing as ordered Md. Patient up to the bedside commode with assistance. Hourly rounding on patient to promote safety. Safety maintained throughout the shift.Patient positions and repositions self independently. No  Skin break down.   Bed locked and in lowest position. Call light in reach. Side rails up x2. NON skid socks on when OOB. Patient remained free of falls/ trauma.  Will continue to monitor.

## 2017-11-11 NOTE — SUBJECTIVE & OBJECTIVE
"Past Medical History:   Diagnosis Date    Blindness of left eye     Cataract     COPD (chronic obstructive pulmonary disease)     Glaucoma     Hyperlipemia        Past Surgical History:   Procedure Laterality Date    CATARACT EXTRACTION Bilateral     HYSTERECTOMY         Review of patient's allergies indicates:   Allergen Reactions    Iodine and iodide containing products Anaphylaxis     " I die"       No current facility-administered medications on file prior to encounter.      Current Outpatient Prescriptions on File Prior to Encounter   Medication Sig    albuterol (ACCUNEB) 0.63 mg/3 mL Nebu Take 3 mLs (0.63 mg total) by nebulization every 6 (six) hours as needed.    ascorbic acid, vitamin C, (VITAMIN C) 500 MG tablet Take 500 mg by mouth once daily.    aspirin 81 MG Chew Take 1 tablet (81 mg total) by mouth once daily.    atorvastatin (LIPITOR) 10 MG tablet take 1 tablet by mouth once daily    b complex vitamins tablet Take 1 tablet by mouth once daily.    calcium carbonate (OS-TANYA) 600 mg (1,500 mg) Tab Take 600 mg by mouth once daily.    ferrous sulfate 325 mg (65 mg iron) Tab tablet Take 325 mg by mouth once daily.    fish oil-omega-3 fatty acids 300-1,000 mg capsule Take 2 g by mouth once daily.    fluticasone-salmeterol 100-50 mcg/dose (ADVAIR) 100-50 mcg/dose diskus inhaler Inhale 1 puff into the lungs 2 (two) times daily.    glucosamine-chondroitin 500-400 mg tablet Take 1 tablet by mouth 2 (two) times daily.     ipratropium (ATROVENT) 0.02 % nebulizer solution Take 2.5 mLs (500 mcg total) by nebulization 3 (three) times daily as needed for Wheezing.    LUTEIN ORAL Take 1 tablet by mouth once daily.    mometasone (NASONEX) 50 mcg/actuation nasal spray 2 sprays by Nasal route daily as needed.    SIMBRINZA 1-0.2 % DrpS     tafluprost, PF, 0.0015 % Dpet Apply to eye.    tiotropium (SPIRIVA) 18 mcg inhalation capsule Inhale 1 capsule (18 mcg total) into the lungs once daily.    VIT " A/VIT C/VIT E/ZINC/COPPER (PRESERVISION AREDS ORAL) Take 1 tablet by mouth 2 (two) times daily.     [DISCONTINUED] triamcinolone acetonide 0.1% (KENALOG) 0.1 % ointment Apply topically 2 (two) times daily.     Family History     Problem Relation (Age of Onset)    Heart attack Father    No Known Problems Mother        Social History Main Topics    Smoking status: Former Smoker     Packs/day: 0.25     Years: 66.00     Types: Cigarettes     Quit date: 10/10/2017    Smokeless tobacco: Never Used      Comment: down to 2 cigatettes a day (8/22/16)    Alcohol use No    Drug use: No    Sexual activity: Not Currently     Partners: Male     Review of Systems   Constitutional: Positive for activity change, appetite change and fatigue. Negative for chills and fever.   Respiratory: Positive for shortness of breath. Negative for cough.    Cardiovascular: Negative for chest pain and leg swelling.   Gastrointestinal: Negative for abdominal pain, nausea and vomiting.   Endocrine: Negative for cold intolerance.   Genitourinary: Negative for difficulty urinating and dysuria.   Musculoskeletal: Positive for back pain.   Neurological: Negative for tremors, seizures and light-headedness.   Psychiatric/Behavioral: Positive for confusion and dysphoric mood. Negative for hallucinations. The patient is not nervous/anxious.    All other systems reviewed and are negative.    Objective:     Vital Signs (Most Recent):  Temp: 97.4 °F (36.3 °C) (11/10/17 1540)  Pulse: 86 (11/10/17 1540)  Resp: 16 (11/10/17 1540)  BP: 107/66 (11/10/17 1540)  SpO2: 98 % (11/10/17 1555) Vital Signs (24h Range):  Temp:  [97.4 °F (36.3 °C)-97.5 °F (36.4 °C)] 97.4 °F (36.3 °C)  Pulse:  [] 86  Resp:  [14-28] 16  SpO2:  [86 %-100 %] 98 %  BP: ()/(50-67) 107/66     Weight: 38.1 kg (84 lb)  Body mass index is 14.42 kg/m².    Physical Exam   Constitutional: She is oriented to person, place, and time. Vital signs are normal. She appears cachectic. She is  active. She has a sickly appearance.   HENT:   Head: Normocephalic and atraumatic.   Right Ear: External ear normal.   Left Ear: External ear normal.   Mouth/Throat: Mucous membranes are dry.   Eyes: Conjunctivae and EOM are normal.   Neck: Trachea normal and full passive range of motion without pain. Neck supple. No JVD present. No thyromegaly present.   Cardiovascular: Normal rate and regular rhythm.  PMI is not displaced.    No murmur heard.  Pulmonary/Chest: Effort normal and breath sounds normal. She exhibits no mass and no tenderness.   Abdominal: Soft. Normal appearance and bowel sounds are normal. There is no hepatosplenomegaly. There is no tenderness.   Musculoskeletal: Normal range of motion. She exhibits no edema.   Neurological: She is alert and oriented to person, place, and time. She has normal strength. No cranial nerve deficit.   Skin: Skin is warm and dry. Capillary refill takes less than 2 seconds. No rash noted.   Psychiatric: She has a normal mood and affect. Her speech is normal. She is slowed. Cognition and memory are impaired.        Significant Labs:   BMP:   Recent Labs  Lab 11/10/17  1223   *      K 3.5   CL 99   CO2 29   BUN 24*   CREATININE 0.9   CALCIUM 12.2*     CBC:   Recent Labs  Lab 11/10/17  1223   WBC 11.80   HGB 13.3   HCT 40.5          Significant Imaging: I have reviewed all pertinent imaging results/findings within the past 24 hours.

## 2017-11-11 NOTE — ASSESSMENT & PLAN NOTE
CT chest.  Consult with pulmonologist.  Patient is not a candidate for systemic treatment.  Not a candidate for resection.

## 2017-11-11 NOTE — PROGRESS NOTES
11/11/17 0742   Patient Assessment/Suction   Level of Consciousness (AVPU) alert   Respiratory Effort Normal   All Lung Fields Breath Sounds clear;diminished   Cough Type good;nonproductive   PRE-TX-O2-ETCO2   O2 Device (Oxygen Therapy) room air   SpO2 97 %   Pulse Oximetry Type Intermittent   $ Pulse Oximetry - Multiple Charge Pulse Oximetry - Multiple   Pulse 79   Resp 18   Aerosol Therapy   $ Aerosol Therapy Charges Aerosol Treatment   Respiratory Treatment Status given   SVN/Inhaler Treatment Route mask   Position During Treatment HOB at 45 degrees   Patient Tolerance good   Inhaler   $ Inhaler Charges MDI (Metered Dose Inahler) Treatment   Respiratory Treatment Status given   SVN/Inhaler Treatment Route mouthpiece   Patient Tolerance good   Post-Treatment   Post-treatment Heart Rate (beats/min) 77   Post-treatment Resp Rate (breaths/min) 18   All Fields Breath Sounds aeration increased   Pt receives atrovent q6, duoneb q6prn, breo qd, no prn tx required, pt currently on RA,  tolerated scheduled tx well, vitals are as charted.

## 2017-11-12 PROBLEM — I95.9 HYPOTENSION: Status: ACTIVE | Noted: 2017-11-12

## 2017-11-12 LAB
ANION GAP SERPL CALC-SCNC: 9 MMOL/L
BASOPHILS # BLD AUTO: 0 K/UL
BASOPHILS NFR BLD: 0.2 %
BUN SERPL-MCNC: 7 MG/DL
CALCIUM SERPL-MCNC: 8.9 MG/DL
CHLORIDE SERPL-SCNC: 103 MMOL/L
CO2 SERPL-SCNC: 28 MMOL/L
CREAT SERPL-MCNC: 0.6 MG/DL
DIFFERENTIAL METHOD: ABNORMAL
EOSINOPHIL # BLD AUTO: 0.1 K/UL
EOSINOPHIL NFR BLD: 1.2 %
ERYTHROCYTE [DISTWIDTH] IN BLOOD BY AUTOMATED COUNT: 13.8 %
EST. GFR  (AFRICAN AMERICAN): >60 ML/MIN/1.73 M^2
EST. GFR  (NON AFRICAN AMERICAN): >60 ML/MIN/1.73 M^2
GLUCOSE SERPL-MCNC: 96 MG/DL
HCT VFR BLD AUTO: 35.8 %
HGB BLD-MCNC: 11.8 G/DL
LYMPHOCYTES # BLD AUTO: 1.1 K/UL
LYMPHOCYTES NFR BLD: 10.1 %
MCH RBC QN AUTO: 31.3 PG
MCHC RBC AUTO-ENTMCNC: 33 G/DL
MCV RBC AUTO: 95 FL
MONOCYTES # BLD AUTO: 0.4 K/UL
MONOCYTES NFR BLD: 3.8 %
NEUTROPHILS # BLD AUTO: 9.6 K/UL
NEUTROPHILS NFR BLD: 84.7 %
PLATELET # BLD AUTO: 189 K/UL
PMV BLD AUTO: 10 FL
POTASSIUM SERPL-SCNC: 2.9 MMOL/L
RBC # BLD AUTO: 3.78 M/UL
SODIUM SERPL-SCNC: 140 MMOL/L
WBC # BLD AUTO: 11.4 K/UL

## 2017-11-12 PROCEDURE — 12000002 HC ACUTE/MED SURGE SEMI-PRIVATE ROOM

## 2017-11-12 PROCEDURE — 25000242 PHARM REV CODE 250 ALT 637 W/ HCPCS: Performed by: HOSPITALIST

## 2017-11-12 PROCEDURE — 25000003 PHARM REV CODE 250: Performed by: HOSPITALIST

## 2017-11-12 PROCEDURE — 93306 TTE W/DOPPLER COMPLETE: CPT

## 2017-11-12 PROCEDURE — 93306 TTE W/DOPPLER COMPLETE: CPT | Mod: 26,,, | Performed by: INTERNAL MEDICINE

## 2017-11-12 PROCEDURE — 80048 BASIC METABOLIC PNL TOTAL CA: CPT

## 2017-11-12 PROCEDURE — 85025 COMPLETE CBC W/AUTO DIFF WBC: CPT

## 2017-11-12 PROCEDURE — 25000003 PHARM REV CODE 250: Performed by: INTERNAL MEDICINE

## 2017-11-12 PROCEDURE — 63600175 PHARM REV CODE 636 W HCPCS: Performed by: INTERNAL MEDICINE

## 2017-11-12 PROCEDURE — 36415 COLL VENOUS BLD VENIPUNCTURE: CPT

## 2017-11-12 PROCEDURE — 99232 SBSQ HOSP IP/OBS MODERATE 35: CPT | Mod: ,,, | Performed by: INTERNAL MEDICINE

## 2017-11-12 PROCEDURE — 94640 AIRWAY INHALATION TREATMENT: CPT

## 2017-11-12 PROCEDURE — 94761 N-INVAS EAR/PLS OXIMETRY MLT: CPT

## 2017-11-12 RX ORDER — POTASSIUM CHLORIDE 7.45 MG/ML
10 INJECTION INTRAVENOUS
Status: DISPENSED | OUTPATIENT
Start: 2017-11-12 | End: 2017-11-12

## 2017-11-12 RX ORDER — POTASSIUM CHLORIDE 20 MEQ/1
60 TABLET, EXTENDED RELEASE ORAL ONCE
Status: COMPLETED | OUTPATIENT
Start: 2017-11-12 | End: 2017-11-12

## 2017-11-12 RX ADMIN — POTASSIUM CHLORIDE 60 MEQ: 1500 TABLET, EXTENDED RELEASE ORAL at 02:11

## 2017-11-12 RX ADMIN — POTASSIUM CHLORIDE 10 MEQ: 10 INJECTION, SOLUTION INTRAVENOUS at 04:11

## 2017-11-12 RX ADMIN — IPRATROPIUM BROMIDE 0.5 MG: 0.5 SOLUTION RESPIRATORY (INHALATION) at 12:11

## 2017-11-12 RX ADMIN — POTASSIUM CHLORIDE 10 MEQ: 10 INJECTION, SOLUTION INTRAVENOUS at 05:11

## 2017-11-12 RX ADMIN — ASPIRIN 81 MG CHEWABLE TABLET 81 MG: 81 TABLET CHEWABLE at 08:11

## 2017-11-12 RX ADMIN — POTASSIUM CHLORIDE 10 MEQ: 10 INJECTION, SOLUTION INTRAVENOUS at 09:11

## 2017-11-12 RX ADMIN — IPRATROPIUM BROMIDE 0.5 MG: 0.5 SOLUTION RESPIRATORY (INHALATION) at 07:11

## 2017-11-12 RX ADMIN — IPRATROPIUM BROMIDE 0.5 MG: 0.5 SOLUTION RESPIRATORY (INHALATION) at 01:11

## 2017-11-12 RX ADMIN — FLUTICASONE FUROATE AND VILANTEROL TRIFENATATE 1 PUFF: 100; 25 POWDER RESPIRATORY (INHALATION) at 08:11

## 2017-11-12 RX ADMIN — FERROUS SULFATE TAB EC 325 MG (65 MG FE EQUIVALENT) 325 MG: 325 (65 FE) TABLET DELAYED RESPONSE at 08:11

## 2017-11-12 RX ADMIN — SODIUM CHLORIDE: 0.9 INJECTION, SOLUTION INTRAVENOUS at 08:11

## 2017-11-12 NOTE — PLAN OF CARE
Problem: Patient Care Overview  Goal: Plan of Care Review  Outcome: Ongoing (interventions implemented as appropriate)  Pt rec neb treatments with atrovent and breo mdi. BS diminished yomi with HR 82 bpm and 95% sats on room air.

## 2017-11-12 NOTE — PROGRESS NOTES
Dr Masters notified of pt's hypotension. HR 80's. Pt is asymptomatic, AAO. Order obtained for 250ml/2hr bolus. Bolus initiated.  Night nurse, MARTIN Dixon aware of this.

## 2017-11-12 NOTE — PLAN OF CARE
Problem: Patient Care Overview  Goal: Plan of Care Review  Outcome: Ongoing (interventions implemented as appropriate)  Patient AAOx4. Patient hypotensive throughout shift.  MD notified at beginning of shift by prior nurse.  Bolus ran as ordered.  IVF infusing.  Patient is asymptomatic and BP has had slight elevation.  RADHA and SCD's on.  Pt free from falls this shift.  Bed in lowest position, bed alarm on, wheels locked, call light in reach.  Will continue to monitor.

## 2017-11-12 NOTE — PROGRESS NOTES
"Progress Note  Pulmonary/Critical Care      Admit Date: 11/10/2017    SUBJECTIVE:     HPI/Interval history (See H&P for complete P,F,SHx) :     Stable overnight, no new respiratory complaints, denies increased SOB to me.  Has decreased BP (about 80 sys) but tolerating it.  When asked about evaluating the lung mass I do not get an answer (she has refused workup in the past).    Review of Systems: List if applicable    Pain scale: 0/10    Review of Systems   Constitutional: Positive for malaise/fatigue.   Neurological: Positive for weakness.   All other systems reviewed and are negative.      OBJECTIVE:     Vital Signs Range (Last 24H):  Temp:  [97.5 °F (36.4 °C)-98.1 °F (36.7 °C)]   Pulse:  [75-88]   Resp:  [16-20]   BP: ()/(49-58)   SpO2:  [94 %-98 %]     I & O (Last 24H):    Intake/Output Summary (Last 24 hours) at 11/12/17 0834  Last data filed at 11/12/17 0600   Gross per 24 hour   Intake          3811.67 ml   Output                0 ml   Net          3811.67 ml       Estimated body mass index is 14.42 kg/m² as calculated from the following:    Height as of this encounter: 5' 4" (1.626 m).    Weight as of this encounter: 38.1 kg (83 lb 15.9 oz).    Vent Settings-      ABG  No results for input(s): PH, PO2, PCO2, HCO3, BE in the last 168 hours.    Physical Exam:  Physical Exam   Constitutional: No distress.   Thin, frail   HENT:   Head: Normocephalic and atraumatic.   Eyes: Conjunctivae are normal. Pupils are equal, round, and reactive to light.   Neck: Normal range of motion. Neck supple. No JVD present. No tracheal deviation present. No thyromegaly present.   Cardiovascular: Normal rate and regular rhythm.  Exam reveals no gallop and no friction rub.    No murmur heard.  Pulmonary/Chest: Effort normal. No stridor. No respiratory distress. She has no wheezes. She has no rales.   Decreased BS throughout   Abdominal: Soft. Bowel sounds are normal. She exhibits no distension. There is no tenderness. There is " no rebound.   scaphoid   Musculoskeletal: She exhibits no edema or tenderness.   Neurological: She is alert.   Skin: Skin is warm and dry. She is not diaphoretic.   Vitals reviewed.      Laboratory/Diagnostic Data:    Recent Results (from the past 336 hour(s))   Complete Blood Count (CBC)    Collection Time: 11/10/17 12:23 PM   Result Value Ref Range    WBC 11.80 3.90 - 12.70 K/uL    Hemoglobin 13.3 12.0 - 16.0 g/dL    Hematocrit 40.5 37.0 - 48.5 %    Platelets 229 150 - 350 K/uL       Recent Results (from the past 336 hour(s))   Basic metabolic panel    Collection Time: 11/11/17  5:49 AM   Result Value Ref Range    Sodium 143 136 - 145 mmol/L    Potassium 3.8 3.5 - 5.1 mmol/L    Chloride 105 95 - 110 mmol/L    CO2 28 23 - 29 mmol/L    BUN, Bld 14 8 - 23 mg/dL    Creatinine 0.7 0.5 - 1.4 mg/dL    Calcium 10.5 8.7 - 10.5 mg/dL    Anion Gap 10 8 - 16 mmol/L       Lab Results   Component Value Date    ALT 7 (L) 11/10/2017    AST 21 11/10/2017    GGT 49 08/26/2016    ALKPHOS 109 11/10/2017    BILITOT 0.9 11/10/2017       Invalid input(s): PT,  INR,  APTT    Microbiology    Microbiology Results (last 7 days)     ** No results found for the last 168 hours. **          Radiology    Imaging Results          CT Chest Without Contrast (Edited Result - FINAL)  Result time 11/11/17 10:48:22    Addendum 1 of 1 by Iman Tran MD (11/11/17 10:48:22)    Note is also made of debris along the posterior aspect of the trachea at the thoracic inlet which has become apparent in the prior exam likely representing aspirated material      Electronically signed by: IMAN TRAN MD  Date:     11/11/17  Time:    10:48                Final result by Iman Tran MD (11/11/17 10:46:19)                 Impression:        Increase in the size of the mass right midlung field compared to the prior exam an mild increase in size of other nodules scattered throughout the lungs consistent with primary lung cancer and metastatic  disease.    Emphysematous changes        Final Read        Electronically signed by: JEREMIAH TRAN MD  Date:     11/11/17  Time:    10:46              Narrative:    Axial scans of the chest were obtained without IV contrast and sagittal and coronal reformatted images were obtained  Comparison study: 1/9/2017    FINDINGS    There is dilatation of the ascending aorta measuring 4 cm to this calcification the thoracic aorta, great vessels, coronary arteries, visualized abdominal aorta and its major branches.    There is approximately 6.7 x 4.0 x 6.6 cm mass right midlung extending to, abutting and indistinguishable from right hilar structures and extending to the right lateral chest wall.  Findings are most suggestive of primary lung cancer.  With the right hilum obscured reliable evaluation for right hilar adenopathy cannot be made.  That no significant pretracheal/precarinal or left hilar adenopathy is seen.  There is no pleural or pericardial effusion.  This mass is increased in size compared to prior exam where was measured at 4.5 x 3.2 cm    Separate from this is 12 mm nodule in the superior segment of the right lower lobe which is larger and more defined than on the prior exam.  There are other scattered smaller nodules including a region posterior segment of the right upper lobe, innumerable small nodules/peripheral nodular infiltrate around the mass extending inferiorly into the right middle lobe with larger more well-defined 7 mm nodule anteriorly in the right middle lobe.  There is some a discoid-like opacification right posterior lung base and apical stranding that may be chronic.  There is also 9 mm groundglass opacification laterally left lower lobe which is larger today.  Nodules or nodular infiltrate inferiorly in the lingula do not appear significant a change.    Underlying emphysematous changes.    There are degenerative changes and disc desiccation the thoracic spine and posterior disc osteophyte  complexes                             X-Ray Chest 1 View (Final result)  Result time 11/10/17 12:35:42    Final result by Noah Castillo MD (11/10/17 12:35:42)                 Impression:      1.  Interval further increase in size of large right mid lung zone when compared to the prior studies, most likely representing a malignant mass such as a bronchogenic carcinoma. Probable right hilar lymphadenopathy.    2. Patchy linear and nodular opacities are also noted in each lung which are most compatible with indolent infection such as TRENTON, as noted previously.    Findings discussed with Dr. Vivas at 12:35 hours on 11/10/17.        Electronically signed by: Noah Castillo MD  Date:     11/10/17  Time:    12:35              Narrative:    Comparison: 11/9/17 and prior    Technique: Single AP portable chest radiograph.    Findings:There has been a further interval increase in size of right midlung zone mass when compared to CT  radiograph dated 1/9/17, most likely representing a malignant mass such as bronchogenic carcinoma. This measures up to approximately 7 cm transverse. There is pulmonary emphysema. Additional nodular opacities are identified in each upper and lower lung which most likely represent indolent infection given prior CT findings. No pneumothorax is seen. There is right hilar fullness which has increased compared to the 10/24/16 radiographs marked with an arrow.    No acute osseous abnormality is seen. There are healed rib fractures in the left upper posterior chest.                             RADIOLOGY REPORT (Final result)  Result time 11/11/17 09:22:06                Medications:     aspirin  81 mg Oral Daily    ferrous sulfate  325 mg Oral Daily    fluticasone-vilanterol  1 puff Inhalation Daily    ipratropium  0.5 mg Nebulization Q6H        sodium chloride 0.9% 100 mL/hr at 11/12/17 0813       albuterol-ipratropium 2.5mg-0.5mg/3mL    ASSESSMENT/PLAN:     Active Problems:    Active  Hospital Problems    Diagnosis  POA    *Hypercalcemia [E83.52]  Yes    Severe malnutrition [E43]  Yes    Mass of right lung [R91.8]  Yes      Resolved Hospital Problems    Diagnosis Date Resolved POA   No resolved problems to display.     Lung cancer  - pt with large mass which is almost certainly a lung cancer  - she is too frail to consider bronch or biopsy without significant risk  - her performance status is too poor to consider chemotherapy  - only possible treatment would be XRT  - realistically hospice is probably the most appropriate course  - agree with DNR  Hypoxemia  - better  - continue with O2 as needed  H/o cigarette use  - aware  Probable COPD  - respiratory treatments  Hypercalcemia  - better  - may be related to underlying cancer  Dehydration  - better  Malnutrition  - needs dietary supplements  Hypotension  - she is tolerating this  - ? If this is her baseline    I will continue to follow with the pt.  Please call me at [phone] if you have any questions.      Deon Jiménez MD

## 2017-11-12 NOTE — PROGRESS NOTES
[facility]  Cardiology  Progress Note    Patient Name: Lola Frazier  MRN: [id number]  Admission Date: 11/10/2017  Hospital Length of Stay: 1 days  Code Status: DNR   Attending Physician: Luiz Masters MD   Primary Care Physician: Yara Meléndez MD  Expected Discharge Date:   Principal Problem:Hypercalcemia    Subjective:     Hospital Course:   No notes on file    Interval History: Pt still feels weak but relatively better    Review of Systems   Constitution: Positive for weakness and malaise/fatigue.   HENT: Negative.    Eyes: Negative.    Cardiovascular: Negative.    Respiratory: Negative.    Endocrine: Negative.    Skin: Negative.    Musculoskeletal: Negative.    Gastrointestinal: Negative.    Genitourinary: Negative.    Psychiatric/Behavioral: Negative.    Allergic/Immunologic: Negative.      Objective:     Vital Signs (Most Recent):  Temp: 97.5 °F (36.4 °C) (11/11/17 1953)  Pulse: 88 (11/11/17 1953)  Resp: 16 (11/11/17 1953)  BP: (!) 94/54 (11/11/17 1953)  SpO2: 96 % (11/11/17 1953) Vital Signs (24h Range):  Temp:  [96.5 °F (35.8 °C)-98.1 °F (36.7 °C)] 97.5 °F (36.4 °C)  Pulse:  [79-88] 88  Resp:  [16-20] 16  SpO2:  [94 %-97 %] 96 %  BP: ()/(49-58) 94/54     Weight: 38.1 kg (83 lb 15.9 oz)  Body mass index is 14.42 kg/m².     SpO2: 96 %  O2 Device (Oxygen Therapy): room air      Intake/Output Summary (Last 24 hours) at 11/11/17 2244  Last data filed at 11/11/17 1800   Gross per 24 hour   Intake          6445.42 ml   Output                0 ml   Net          6445.42 ml       Lines/Drains/Airways     Peripheral Intravenous Line                 Peripheral IV - Single Lumen 11/11/17 1359 Right Forearm less than 1 day                Physical Exam   Constitutional: She is oriented to person, place, and time. She appears well-developed and well-nourished.   HENT:   Head: Normocephalic and atraumatic.   Mouth/Throat: Oropharynx is clear and moist.   Eyes: Conjunctivae and EOM are normal. Pupils  are equal, round, and reactive to light.   Neck: Normal range of motion. Neck supple. No JVD present. No tracheal deviation present. No thyromegaly present.   Cardiovascular: Normal rate, regular rhythm and intact distal pulses.    Pulmonary/Chest: Effort normal and breath sounds normal. No respiratory distress. She has no wheezes. She has no rales. She exhibits no tenderness.   Abdominal: Soft. Bowel sounds are normal. She exhibits no distension and no mass. There is no tenderness. There is no rebound and no guarding.   Musculoskeletal: Normal range of motion. She exhibits no edema, tenderness or deformity.   Lymphadenopathy:     She has no cervical adenopathy.   Neurological: She is alert and oriented to person, place, and time. She has normal reflexes. No cranial nerve deficit. Coordination normal.   Skin: Skin is warm and dry.   Psychiatric: She has a normal mood and affect.   Nursing note and vitals reviewed.      Significant Labs:   CMP   Recent Labs  Lab 11/10/17  1223 11/11/17  0549    143   K 3.5 3.8   CL 99 105   CO2 29 28   * 80   BUN 24* 14   CREATININE 0.9 0.7   CALCIUM 12.2* 10.5   PROT 7.8  --    ALBUMIN 2.7*  --    BILITOT 0.9  --    ALKPHOS 109  --    AST 21  --    ALT 7*  --    ANIONGAP 12 10   ESTGFRAFRICA >60 >60   EGFRNONAA 58* >60   , CBC   Recent Labs  Lab 11/10/17  1223   WBC 11.80   HGB 13.3   HCT 40.5       and INR No results for input(s): INR, PROTIME in the last 48 hours.    Significant Imaging: X-Ray: CXR: X-Ray Chest 1 View (CXR):   Results for orders placed or performed during the hospital encounter of 11/10/17   X-Ray Chest 1 View    Narrative    Comparison: 11/9/17 and prior    Technique: Single AP portable chest radiograph.    Findings:There has been a further interval increase in size of right midlung zone mass when compared to CT  radiograph dated 1/9/17, most likely representing a malignant mass such as bronchogenic carcinoma. This measures up to  approximately 7 cm transverse. There is pulmonary emphysema. Additional nodular opacities are identified in each upper and lower lung which most likely represent indolent infection given prior CT findings. No pneumothorax is seen. There is right hilar fullness which has increased compared to the 10/24/16 radiographs marked with an arrow.    No acute osseous abnormality is seen. There are healed rib fractures in the left upper posterior chest.    Impression    1.  Interval further increase in size of large right mid lung zone when compared to the prior studies, most likely representing a malignant mass such as a bronchogenic carcinoma. Probable right hilar lymphadenopathy.    2. Patchy linear and nodular opacities are also noted in each lung which are most compatible with indolent infection such as TR[place]ON, as noted previously.    Findings discussed with Dr. Vivas at 12:35 hours on 11/10/17.        Electronically signed by: Noah Castillo MD  Date:     11/10/17  Time:    12:35      Assessment and Plan:     Brief HPI:    * Hypercalcemia    - In the setting of possible lung cancer - which is not tissue diagnosed yet as there is no desire to treat  - Continuous IVF has improved the level down to 10.4   - Continue IVF and monitor closely.   -Pt does not have history of congestive heart failure.         Mass of right lung    - Possible lung cancer, but pt does not want to pursue due to no intention to treat         Severe malnutrition    - In the setting of suspected cachexia related to possible lung cancer.   -Dietary encouragement and education.             VTE Risk Mitigation         Ordered     Medium Risk of VTE  Once      11/10/17 1448     Place RADHA hose  Until discontinued      11/10/17 1448     Place sequential compression device  Until discontinued      11/10/17 1448          Luiz Masters MD  Cardiology  [facility]

## 2017-11-12 NOTE — ASSESSMENT & PLAN NOTE
- In the setting of possible lung cancer - which is not tissue diagnosed yet as there is no desire to treat  - Continuous IVF has improved the level down to 10.4   - Continue IVF and monitor closely.   -Pt does not have history of congestive heart failure.

## 2017-11-12 NOTE — SUBJECTIVE & OBJECTIVE
Interval History: Pt still feels weak but relatively better    Review of Systems   Constitution: Positive for weakness and malaise/fatigue.   HENT: Negative.    Eyes: Negative.    Cardiovascular: Negative.    Respiratory: Negative.    Endocrine: Negative.    Skin: Negative.    Musculoskeletal: Negative.    Gastrointestinal: Negative.    Genitourinary: Negative.    Psychiatric/Behavioral: Negative.    Allergic/Immunologic: Negative.      Objective:     Vital Signs (Most Recent):  Temp: 97.5 °F (36.4 °C) (11/11/17 1953)  Pulse: 88 (11/11/17 1953)  Resp: 16 (11/11/17 1953)  BP: (!) 94/54 (11/11/17 1953)  SpO2: 96 % (11/11/17 1953) Vital Signs (24h Range):  Temp:  [96.5 °F (35.8 °C)-98.1 °F (36.7 °C)] 97.5 °F (36.4 °C)  Pulse:  [79-88] 88  Resp:  [16-20] 16  SpO2:  [94 %-97 %] 96 %  BP: ()/(49-58) 94/54     Weight: 38.1 kg (83 lb 15.9 oz)  Body mass index is 14.42 kg/m².     SpO2: 96 %  O2 Device (Oxygen Therapy): room air      Intake/Output Summary (Last 24 hours) at 11/11/17 2244  Last data filed at 11/11/17 1800   Gross per 24 hour   Intake          6445.42 ml   Output                0 ml   Net          6445.42 ml       Lines/Drains/Airways     Peripheral Intravenous Line                 Peripheral IV - Single Lumen 11/11/17 1359 Right Forearm less than 1 day                Physical Exam   Constitutional: She is oriented to person, place, and time. She appears well-developed and well-nourished.   HENT:   Head: Normocephalic and atraumatic.   Mouth/Throat: Oropharynx is clear and moist.   Eyes: Conjunctivae and EOM are normal. Pupils are equal, round, and reactive to light.   Neck: Normal range of motion. Neck supple. No JVD present. No tracheal deviation present. No thyromegaly present.   Cardiovascular: Normal rate, regular rhythm and intact distal pulses.    Pulmonary/Chest: Effort normal and breath sounds normal. No respiratory distress. She has no wheezes. She has no rales. She exhibits no tenderness.    Abdominal: Soft. Bowel sounds are normal. She exhibits no distension and no mass. There is no tenderness. There is no rebound and no guarding.   Musculoskeletal: Normal range of motion. She exhibits no edema, tenderness or deformity.   Lymphadenopathy:     She has no cervical adenopathy.   Neurological: She is alert and oriented to person, place, and time. She has normal reflexes. No cranial nerve deficit. Coordination normal.   Skin: Skin is warm and dry.   Psychiatric: She has a normal mood and affect.   Nursing note and vitals reviewed.      Significant Labs:   CMP   Recent Labs  Lab 11/10/17  1223 11/11/17  0549    143   K 3.5 3.8   CL 99 105   CO2 29 28   * 80   BUN 24* 14   CREATININE 0.9 0.7   CALCIUM 12.2* 10.5   PROT 7.8  --    ALBUMIN 2.7*  --    BILITOT 0.9  --    ALKPHOS 109  --    AST 21  --    ALT 7*  --    ANIONGAP 12 10   ESTGFRAFRICA >60 >60   EGFRNONAA 58* >60   , CBC   Recent Labs  Lab 11/10/17  1223   WBC 11.80   HGB 13.3   HCT 40.5       and INR No results for input(s): INR, PROTIME in the last 48 hours.    Significant Imaging: X-Ray: CXR: X-Ray Chest 1 View (CXR):   Results for orders placed or performed during the hospital encounter of 11/10/17   X-Ray Chest 1 View    Narrative    Comparison: 11/9/17 and prior    Technique: Single AP portable chest radiograph.    Findings:There has been a further interval increase in size of right midlung zone mass when compared to CT  radiograph dated 1/9/17, most likely representing a malignant mass such as bronchogenic carcinoma. This measures up to approximately 7 cm transverse. There is pulmonary emphysema. Additional nodular opacities are identified in each upper and lower lung which most likely represent indolent infection given prior CT findings. No pneumothorax is seen. There is right hilar fullness which has increased compared to the 10/24/16 radiographs marked with an arrow.    No acute osseous abnormality is seen. There  are healed rib fractures in the left upper posterior chest.    Impression    1.  Interval further increase in size of large right mid lung zone when compared to the prior studies, most likely representing a malignant mass such as a bronchogenic carcinoma. Probable right hilar lymphadenopathy.    2. Patchy linear and nodular opacities are also noted in each lung which are most compatible with indolent infection such as TR[place], as noted previously.    Findings discussed with Dr. Vivas at 12:35 hours on 11/10/17.        Electronically signed by: Noah Castillo MD  Date:     11/10/17  Time:    12:35

## 2017-11-12 NOTE — ASSESSMENT & PLAN NOTE
- In the setting of suspected cachexia related to possible lung cancer.   -Dietary encouragement and education.

## 2017-11-12 NOTE — PROGRESS NOTES
Obtained order from Dr Masters for CBC and echo due to pt's sustained hypotension. Nurse also obtained order for PT/OT eval and treat due to pt's weakness.

## 2017-11-13 PROBLEM — J44.9 CHRONIC OBSTRUCTIVE PULMONARY DISEASE: Status: ACTIVE | Noted: 2017-11-13

## 2017-11-13 PROBLEM — I95.9 HYPOTENSION: Status: RESOLVED | Noted: 2017-11-12 | Resolved: 2017-11-13

## 2017-11-13 PROBLEM — R09.02 HYPOXIA: Status: ACTIVE | Noted: 2017-11-13

## 2017-11-13 LAB
ANION GAP SERPL CALC-SCNC: 8 MMOL/L
BUN SERPL-MCNC: 5 MG/DL
CALCIUM SERPL-MCNC: 9.1 MG/DL
CHLORIDE SERPL-SCNC: 104 MMOL/L
CO2 SERPL-SCNC: 26 MMOL/L
CREAT SERPL-MCNC: 0.6 MG/DL
EST. GFR  (AFRICAN AMERICAN): >60 ML/MIN/1.73 M^2
EST. GFR  (NON AFRICAN AMERICAN): >60 ML/MIN/1.73 M^2
GLUCOSE SERPL-MCNC: 100 MG/DL
POTASSIUM SERPL-SCNC: 4.3 MMOL/L
SODIUM SERPL-SCNC: 138 MMOL/L

## 2017-11-13 PROCEDURE — G8988 SELF CARE GOAL STATUS: HCPCS | Mod: CI

## 2017-11-13 PROCEDURE — 80048 BASIC METABOLIC PNL TOTAL CA: CPT

## 2017-11-13 PROCEDURE — G8987 SELF CARE CURRENT STATUS: HCPCS | Mod: CJ

## 2017-11-13 PROCEDURE — 94761 N-INVAS EAR/PLS OXIMETRY MLT: CPT

## 2017-11-13 PROCEDURE — 97535 SELF CARE MNGMENT TRAINING: CPT

## 2017-11-13 PROCEDURE — 36415 COLL VENOUS BLD VENIPUNCTURE: CPT

## 2017-11-13 PROCEDURE — 12000002 HC ACUTE/MED SURGE SEMI-PRIVATE ROOM

## 2017-11-13 PROCEDURE — 94640 AIRWAY INHALATION TREATMENT: CPT

## 2017-11-13 PROCEDURE — 97165 OT EVAL LOW COMPLEX 30 MIN: CPT

## 2017-11-13 PROCEDURE — 25000003 PHARM REV CODE 250: Performed by: HOSPITALIST

## 2017-11-13 PROCEDURE — 99232 SBSQ HOSP IP/OBS MODERATE 35: CPT | Mod: ,,, | Performed by: INTERNAL MEDICINE

## 2017-11-13 PROCEDURE — 25000242 PHARM REV CODE 250 ALT 637 W/ HCPCS: Performed by: HOSPITALIST

## 2017-11-13 RX ADMIN — IPRATROPIUM BROMIDE 0.5 MG: 0.5 SOLUTION RESPIRATORY (INHALATION) at 12:11

## 2017-11-13 RX ADMIN — IPRATROPIUM BROMIDE 0.5 MG: 0.5 SOLUTION RESPIRATORY (INHALATION) at 07:11

## 2017-11-13 RX ADMIN — FERROUS SULFATE TAB EC 325 MG (65 MG FE EQUIVALENT) 325 MG: 325 (65 FE) TABLET DELAYED RESPONSE at 08:11

## 2017-11-13 RX ADMIN — FLUTICASONE FUROATE AND VILANTEROL TRIFENATATE 1 PUFF: 100; 25 POWDER RESPIRATORY (INHALATION) at 08:11

## 2017-11-13 RX ADMIN — ASPIRIN 81 MG CHEWABLE TABLET 81 MG: 81 TABLET CHEWABLE at 08:11

## 2017-11-13 NOTE — ASSESSMENT & PLAN NOTE
- In the setting of possible lung cancer - which is not tissue diagnosed yet as there is no desire to treat  - Improved with improved symptoms.   - Possible discharge tomorrow with follow up with her primary care physician as fmallison and pt herself do not want to pursue the lesion in her lung which is most probably lung cancer, causing hypercalcemia of malignancy

## 2017-11-13 NOTE — PLAN OF CARE
Problem: Patient Care Overview  Goal: Plan of Care Review  Outcome: Ongoing (interventions implemented as appropriate)  K riders currently infusing. PO potassium given. Pt up to BSC multiple times with stand by assist, remained free from injury through shift. IV fluids discontinued. Tolerating diet, not eating much. B/P stable.

## 2017-11-13 NOTE — PLAN OF CARE
Problem: Nutrition, Imbalanced: Inadequate Oral Intake (Adult)  Goal: Identify Related Risk Factors and Signs and Symptoms  Related risk factors and signs and symptoms are identified upon initiation of Human Response Clinical Practice Guideline (CPG)   Outcome: Ongoing (interventions implemented as appropriate)  Recommendations  Recommendation/Intervention:   1.) Continue with Adult regualr diet   Goals: increase PO intake to >50% of all meals and supplements and maintain wt by discharge  Nutrition Goal Status: 1.) goal not met  Communication of RD Recs:  (POC reviewed)

## 2017-11-13 NOTE — PLAN OF CARE
Problem: Patient Care Overview  Goal: Plan of Care Review  Outcome: Ongoing (interventions implemented as appropriate)  Patient receiving aerosol tx via 0.5mg atrovent now and q6hr, Breo mdi x 1 puff now and qday and Duoneb [id number].  Hr 83 and 02 saturation 945 on room air.

## 2017-11-13 NOTE — ASSESSMENT & PLAN NOTE
Due to malignancy, dehydration, and Ca supplement.  Much improved after getting IV saline and a dose of bisphosphonate.  PTH within normal range.

## 2017-11-13 NOTE — PT/OT/SLP PROGRESS
Physical Therapy      Lola Frazier  MRN: [id number]    PT order received and chart reviewed. Attempted to see patient for initial evaluation. Pt set up with Hospice this AM and asleep upon PT arrival. No family present so evaluation deferred. PT will continue to follow and attempt at a later time as schedule permits and patient is appropriate.     Jessica LeJeune, PT, DPT

## 2017-11-13 NOTE — NURSING
Notified KEVIN Acosta NP of K level of 4.3 and that patient had one K rider left.  Asked if last bag should be hung.  KEVIN Acosta advised to cancel last dose. Cancelled last dose.

## 2017-11-13 NOTE — PROGRESS NOTES
Progress Note  PULMONARY    Admit Date: 11/10/2017   11/13/2017      SUBJECTIVE:     Nov 13, declines evaluation, says ready to go home on hospice.      PFSH and Allergies reviewed.    OBJECTIVE:     Vitals (Most recent):  Vitals:    11/13/17 1608   BP: 104/60   Pulse: 89   Resp: 18   Temp: 97.1 °F (36.2 °C)       Vitals (24 hour range):  Temp:  [97 °F (36.1 °C)-98.9 °F (37.2 °C)]   Pulse:  [6-100]   Resp:  [16-20]   BP: ()/(57-63)   SpO2:  [94 %-98 %]       Intake/Output Summary (Last 24 hours) at 11/13/17 1634  Last data filed at 11/13/17 0600   Gross per 24 hour   Intake             1310 ml   Output                0 ml   Net             1310 ml          Physical Exam:  The patient's neuro status (alertness,orientation,cognitive function,motor skills,), pharyngeal exam (oral lesions, hygiene, abn dentition,), Neck (jvd,mass,thyroid,nodes in neck and above/below clavicle),RESPIRATORY(symmetry,effort,fremitus,percussion,auscultation),  Cor(rhythm,heart tones including gallops,perfusion,edema)ABD(distention,hepatic&splenomegaly,tenderness,masses), Skin(rash,cyanosis),Psyc(affect,judgement,).  Exam negative except for these pertinent findings:    Bright and elderly frail looking, good bs, no distress, symmetric, no edema, nl cor tones, soft abd    Radiographs reviewed: view by direct vision  right lung mass with lung mets suggested on ct my view  Results for orders placed during the hospital encounter of 11/10/17   X-Ray Chest 1 View    Narrative Comparison: 11/9/17 and prior    Technique: Single AP portable chest radiograph.    Findings:There has been a further interval increase in size of right midlung zone mass when compared to CT  radiograph dated 1/9/17, most likely representing a malignant mass such as bronchogenic carcinoma. This measures up to approximately 7 cm transverse. There is pulmonary emphysema. Additional nodular opacities are identified in each upper and lower lung which most likely  represent indolent infection given prior CT findings. No pneumothorax is seen. There is right hilar fullness which has increased compared to the 10/24/16 radiographs marked with an arrow.    No acute osseous abnormality is seen. There are healed rib fractures in the left upper posterior chest.    Impression 1.  Interval further increase in size of large right mid lung zone when compared to the prior studies, most likely representing a malignant mass such as a bronchogenic carcinoma. Probable right hilar lymphadenopathy.    2. Patchy linear and nodular opacities are also noted in each lung which are most compatible with indolent infection such as [place], as noted previously.    Findings discussed with Dr. Vivas at 12:35 hours on 11/10/17.        Electronically signed by: Noah Castillo MD  Date:     11/10/17  Time:    12:35    ]    Labs     No results for input(s): WBC, HGB, HCT, PLT, BAND, METAMYELOCYT, MYELOPCT, HGBA1C in the last 24 hours.  Recent Labs  Lab 11/13/17  0011      K 4.3      CO2 26   BUN 5*   CREATININE 0.6      CALCIUM 9.1   No results for input(s): PH, PCO2, PO2, HCO3 in the last 24 hours.  Microbiology Results (last 7 days)     ** No results found for the last 168 hours. **          Impression:  Active Hospital Problems    Diagnosis  POA    *Hypercalcemia [E83.52]  Yes    Hypotension [I95.9]  Unknown    Severe malnutrition [E43]  Yes    Mass of right lung [R91.8]  Yes      Resolved Hospital Problems    Diagnosis Date Resolved POA   No resolved problems to display.               Plan:     Nov 13, ok for outpt-- she has neb rx for home use.  No new recs                                    .

## 2017-11-13 NOTE — ASSESSMENT & PLAN NOTE
Patient is not a candidate for systemic treatment.  Not a candidate for surgical resection.  She does not want any biopsy or treatment for this mass, which appears to be cancer.  Family is interested in hospice services; consult with case management.

## 2017-11-13 NOTE — PLAN OF CARE
Problem: Occupational Therapy Goal  Goal: Occupational Therapy Goal  Goals to be met by: 11/20/2017     Patient will increase functional independence with ADLs by performing:    UE Dressing with Modified [place].  Grooming while standing at sink with Modified [place].  Upper extremity exercise program 10x reps per handout, with assistance as needed.    Outcome: Ongoing (interventions implemented as appropriate)  OT evaluation completed today. Goals & care plan established.    CIERA Ahn  11/13/2017

## 2017-11-13 NOTE — PLAN OF CARE
AAOx4. Up with assist to bedside commode. PIV flushed saline locked per order.  VSS. Remains afebrile throughout my shift. Patient remains fall free throughout my shift. Pt denies pain, denies needs at this time. Hourly checks preformed. Bed low, brakes locked, SR up x2, call light within reach. Will continue to monitor.

## 2017-11-13 NOTE — PLAN OF CARE
Problem: Patient Care Overview  Goal: Plan of Care Review  Outcome: Ongoing (interventions implemented as appropriate)  Patient AAOx4, VSS.  Pt given K riders during shift.  K level was 4.3 after second dose.  Cancelled third dose.  Patient with assist to bedside commode; uses call light.  Possible D/C in am.  Pt free from falls thus far.  Call light in reach, bed in low position, and wheels locked.  Will continue to monitor.

## 2017-11-13 NOTE — PROGRESS NOTES
[facility] Hospital Medicine  Progress Note    Patient Name: Lola Frazier  MRN: [id number]  Patient Class: IP- Inpatient   Admission Date: 11/10/2017  Length of Stay: 3 days  Attending Physician: Theodore Sousa MD  Primary Care Provider: Yara Meléndez MD        Subjective:     Principal Problem:Hypercalcemia    HPI:  Late last year, she underwent imaging of her chest, which showed a spiculated mass in her right lung.  At the time, she didn't want any further testing or biopsy.  Didn't want any treatment for it.  Lives by herself.  Family brought her to Dr. Meléndez today.  For the past month or two, she's anorexic, sleeping all the time, has no energy, and is depressed.  Finds no marilu in things she used to like to do, such as sewing.  Has lost a lot of weight.  In [facility] office, patient was hypotensive and hypoxic.  Was referred to our ER.  No fevers or chills.  Gets short of breath with exertion.  Has chronic pains in her back.    Hospital Course:  No notes on file    Interval History:  No new complaints.  No change in status.    Review of Systems   Constitutional: Negative for chills and fever.   Neurological: Negative for dizziness and tremors.     Objective:     Vital Signs (Most Recent):  Temp: 97.1 °F (36.2 °C) (11/13/17 1608)  Pulse: 89 (11/13/17 1608)  Resp: 18 (11/13/17 1608)  BP: 104/60 (11/13/17 1608)  SpO2: 96 % (11/13/17 1608) Vital Signs (24h Range):  Temp:  [97 °F (36.1 °C)-98.9 °F (37.2 °C)] 97.1 °F (36.2 °C)  Pulse:  [6-100] 89  Resp:  [16-20] 18  SpO2:  [94 %-98 %] 96 %  BP: ()/(57-63) 104/60     Weight: 38.1 kg (83 lb 15.9 oz)  Body mass index is 14.42 kg/m².    Intake/Output Summary (Last 24 hours) at 11/13/17 1725  Last data filed at 11/13/17 0600   Gross per 24 hour   Intake             1310 ml   Output                0 ml   Net             1310 ml      Physical Exam   Constitutional: She is oriented to person, place, and time. Vital signs are normal. She  appears cachectic. She is active. She has a sickly appearance. No distress.   Eyes: Conjunctivae and EOM are normal.   Neck: Trachea normal, normal range of motion and full passive range of motion without pain. Neck supple. No JVD present.   Cardiovascular: Normal rate and regular rhythm.  PMI is not displaced.    No murmur heard.  Pulmonary/Chest: Effort normal and breath sounds normal. She exhibits no mass and no tenderness.   Abdominal: Soft. Normal appearance and bowel sounds are normal. There is no hepatosplenomegaly. There is no tenderness.   Musculoskeletal: Normal range of motion. She exhibits no edema.   Neurological: She is alert and oriented to person, place, and time. She has normal strength. No cranial nerve deficit.   Skin: Skin is warm and dry. No rash noted. She is not diaphoretic.   Psychiatric: She has a normal mood and affect. Her speech is normal. She is slowed. Cognition and memory are impaired.       Significant Labs: All pertinent labs within the past 24 hours have been reviewed.    Significant Imaging: none    Assessment/Plan:      * Hypercalcemia    Due to malignancy, dehydration, and Ca supplement.  Much improved after getting IV saline and a dose of bisphosphonate.  PTH within normal range.          Mass of right lung    Patient is not a candidate for systemic treatment.  Not a candidate for surgical resection.  She does not want any biopsy or treatment for this mass, which appears to be cancer.  Family is interested in hospice services; consult with case management.          Severe malnutrition    Due to poor intake and malignancy.  Continue with regular diet.  Ensure meal replacement TID.            VTE Risk Mitigation         Ordered     Medium Risk of VTE  Once      11/10/17 1448     Place RADHA hose  Until discontinued      11/10/17 1448     Place sequential compression device  Until discontinued      11/10/17 1448              Theodore Sousa MD  Department of Hospital Medicine    [facility]

## 2017-11-13 NOTE — ASSESSMENT & PLAN NOTE
-Continued to have low blood pressure despite the fluid administtraiotn  - Check an ECHO   - HnH stable  - Leucycotsis may not necessarily be due to possible infection, but in the setting of on going tumor mass.   - However, follow the trend of the fever curve and wbc curve.

## 2017-11-13 NOTE — PLAN OF CARE
Javier received a call from Radha with , she will meet with the family at 7:00pm tonight.  Javier also received a call from Elias with [facility]. She will meet with family at 7:45p.m.  Both agencies will notify me with updated information on the family selection.       11/13/17 9199   Discharge Reassessment   Assessment Type Discharge Planning Reassessment   Provided patient/caregiver education on the expected discharge date and the discharge plan Yes   Do you have any problems affording any of your prescribed medications? No   Discharge Plan A Hospice/home;Home with family

## 2017-11-13 NOTE — SUBJECTIVE & OBJECTIVE
Interval History: No overnight issues    Review of Systems   Constitution: Negative.   HENT: Negative.    Cardiovascular: Negative.    Respiratory: Negative.    Endocrine: Negative.    Skin: Negative.    Musculoskeletal: Negative.    Gastrointestinal: Negative.    Genitourinary: Negative.    Neurological: Negative.    Psychiatric/Behavioral: Negative.    Allergic/Immunologic: Negative.      Objective:     Vital Signs (Most Recent):  Temp: 98.1 °F (36.7 °C) (11/12/17 1957)  Pulse: 100 (11/12/17 1957)  Resp: 20 (11/12/17 1957)  BP: 119/60 (11/12/17 1957)  SpO2: 95 % (11/12/17 1957) Vital Signs (24h Range):  Temp:  [97 °F (36.1 °C)-98.3 °F (36.8 °C)] 98.1 °F (36.7 °C)  Pulse:  [] 100  Resp:  [16-20] 20  SpO2:  [94 %-98 %] 95 %  BP: ()/(50-61) 119/60     Weight: 38.1 kg (83 lb 15.9 oz)  Body mass index is 14.42 kg/m².     SpO2: 95 %  O2 Device (Oxygen Therapy): room air      Intake/Output Summary (Last 24 hours) at 11/12/17 2127  Last data filed at 11/12/17 1800   Gross per 24 hour   Intake             2660 ml   Output                0 ml   Net             2660 ml       Lines/Drains/Airways     Peripheral Intravenous Line                 Peripheral IV - Single Lumen 11/12/17 1445 Left Wrist less than 1 day                Physical Exam   Constitutional: She is oriented to person, place, and time. She appears well-developed and well-nourished.   HENT:   Head: Normocephalic and atraumatic.   Eyes: EOM are normal. Pupils are equal, round, and reactive to light. Right eye exhibits no discharge. Left eye exhibits no discharge.   Neck: Normal range of motion. Neck supple. No JVD present. No tracheal deviation present. No thyromegaly present.   Cardiovascular: Normal rate, regular rhythm and intact distal pulses.    Pulmonary/Chest: Effort normal and breath sounds normal. No stridor.   Abdominal: Soft. Bowel sounds are normal. She exhibits no distension and no mass. There is no tenderness. There is no rebound and no  guarding.   Musculoskeletal: Normal range of motion. She exhibits no edema, tenderness or deformity.   Lymphadenopathy:     She has no cervical adenopathy.   Neurological: She is alert and oriented to person, place, and time. She has normal reflexes. She displays normal reflexes. No cranial nerve deficit. She exhibits normal muscle tone. Coordination normal.   Skin: Skin is warm and dry. No rash noted. No erythema. No pallor.   Psychiatric: She has a normal mood and affect. Judgment and thought content normal.   Nursing note and vitals reviewed.      Significant Labs:   CMP   Recent Labs  Lab 11/11/17  0549 11/12/17  1145    140   K 3.8 2.9*    103   CO2 28 28   GLU 80 96   BUN 14 7*   CREATININE 0.7 0.6   CALCIUM 10.5 8.9   ANIONGAP 10 9   ESTGFRAFRICA >60 >60   EGFRNONAA >60 >60   , CBC   Recent Labs  Lab 11/12/17  1244   WBC 11.40   HGB 11.8*   HCT 35.8*       and INR No results for input(s): INR, PROTIME in the last 48 hours.    Significant Imaging: X-Ray: CXR: X-Ray Chest 1 View (CXR):   Results for orders placed or performed during the hospital encounter of 11/10/17   X-Ray Chest 1 View    Narrative    Comparison: 11/9/17 and prior    Technique: Single AP portable chest radiograph.    Findings:There has been a further interval increase in size of right midlung zone mass when compared to CT  radiograph dated 1/9/17, most likely representing a malignant mass such as bronchogenic carcinoma. This measures up to approximately 7 cm transverse. There is pulmonary emphysema. Additional nodular opacities are identified in each upper and lower lung which most likely represent indolent infection given prior CT findings. No pneumothorax is seen. There is right hilar fullness which has increased compared to the 10/24/16 radiographs marked with an arrow.    No acute osseous abnormality is seen. There are healed rib fractures in the left upper posterior chest.    Impression    1.  Interval further  increase in size of large right mid lung zone when compared to the prior studies, most likely representing a malignant mass such as a bronchogenic carcinoma. Probable right hilar lymphadenopathy.    2. Patchy linear and nodular opacities are also noted in each lung which are most compatible with indolent infection such as [place], as noted previously.    Findings discussed with Dr. Vivas at 12:35 hours on 11/10/17.        Electronically signed by: Noah Castillo MD  Date:     11/10/17  Time:    12:35

## 2017-11-13 NOTE — PROGRESS NOTES
[facility]  Adult Nutrition  Consult Note    SUMMARY     Recommendations  Recommendation/Intervention:   1.) Continue with Adult regualr diet   Goals: increase PO intake to >50% of all meals and supplements and maintain wt by discharge  Nutrition Goal Status: 1.) goal not met  Communication of RD Recs:  (POC reviewed)    1. Hypoxia    2. SOB (shortness of breath)    3. Dehydration    4. Hypercalcemia    5. Hypotension      Past Medical History:   Diagnosis Date    Blindness of left eye     Cataract     COPD (chronic obstructive pulmonary disease)     Glaucoma     Hyperlipemia        Reason for Assessment  Reason for Assessment: physician consult  Diagnosis: other (see comments) (hypercalcemia)  Relevent Medical History: COPD   General Information Comments: Remote assessment completed. Pt found to have mass in right lung. Pt refusing further treatment at this time. Pt lives by herself with home O2, + wtloss of unknown amount, per H&P, pt with cachectic appearance  11/13/17: Patient discharging home with hospice. Very little PO intake.     Nutrition Prescription Ordered    Current Diet Order: Regular  Nutrition Order Comments: no intake recorded     Oral Nutrition Supplement: Boost Plus TID     Evaluation of Received Nutrients/Fluid Intake  IV Fluid (mL): 3600  I/O: +4,133.8  Fluid Required: exceed needs  % Intake of Estimated Energy Needs: 0-25%  % Meal Intake: 25%      Nutrition Risk Screen    Nutrition Risk Screen: no indicators present    Nutrition/Diet History  Patient Reported Diet/Restrictions/Preferences: general  Food Preferences: no cultural or Shinto preferences  Factors Affecting Nutritional Intake: depression    Labs/Tests/Procedures/Meds  Diagnostic Test/Procedure Review: reviewed  Pertinent Labs Reviewed: reviewed  BMP  Lab Results   Component Value Date     11/13/2017    K 4.3 11/13/2017     11/13/2017    CO2 26 11/13/2017    BUN 5 (L) 11/13/2017    CREATININE  "0.6 11/13/2017    CALCIUM 9.1 11/13/2017    ANIONGAP 8 11/13/2017    ESTGFRAFRICA >60 11/13/2017    EGFRNONAA >60 11/13/2017     Lab Results   Component Value Date    ALBUMIN 2.7 (L) 11/10/2017     Lab Results   Component Value Date    CALCIUM 9.1 11/13/2017     No results for input(s): POCTGLUCOSE in the last 24 hours.    Pertinent Medications Reviewed: reviewed  Scheduled Meds:   aspirin  81 mg Oral Daily    ferrous sulfate  325 mg Oral Daily    fluticasone-vilanterol  1 puff Inhalation Daily    ipratropium  0.5 mg Nebulization Q6H         Physical Findings  Overall Physical Appearance: weak, underweight (cachectic per H&P)  Tubes:  (-)  Oral/Mouth Cavity:  (UTO)  Skin: intact (per H&P)    Anthropometrics  Temp: 97 °F (36.1 °C)  Height: 5' 4"  Weight Method:  (RD reviewed)  Weight: 38.1 kg (83 lb 15.9 oz)  Ideal Body Weight (IBW), Female: 120 lb  % Ideal Body Weight, Female (lb): 70 lb  BMI (Calculated): 14.4  BMI Grade: less than 16 protein-energy malnutrition grade III  Weight Loss: unintentional  Weight Change Amount: 13 lb 10.7 oz (per chart review- since 1/23/17)      Estimated/Assessed Needs  Weight Used For Calorie Calculations: 38.1 kg (83 lb 15.9 oz) (IBW)   Height (cm): 162.6 cm  Energy Calorie Requirements (kcal): 5544-7486 (35-40)  Energy Need Method: Kcal/kg  35 kcal/kg (kcal): 1333.5 and 40 kcal/kg (kcal): 1524   RMR (Charlotte-St. Jeor Equation): 811  Weight Used For Protein Calculations: 38.1 kg (83 lb 15.9 oz)  Protein Requirements: 45-57 (1.2-1.5)  1.2 gm Protein (gm): 45.82 and 1.5 gm Protein (gm): 57.27  Fluid Need Method: RDA Method (1ml/kcal or per MD)  RDA Method (mL): 1333      Assessment and Plan  Severe malnutrition     Related to (etiology):   Social/Environmental       Signs and Symptoms (as evidenced by):   Decreased PO intake >2months, wtloss of 14% [id number], BMI 14, 70% IBW, dehydration, anorexia/depression      Interventions/Recommendations (treatment strategy):  Regular diet " with Dary Plus TID  Strong Encouragement PO Intake  Daily MVI  May consider Appetite Stimulant or PPN supplementation if PO intake continues <50%  See RD Note 11/11/17     Nutrition Diagnosis Status:   Continues               Monitor and Evaluation    Food and Nutrient Intake: food and beverage intake, energy intake  Food and Nutrient Adminstration: diet order  Knowledge/Beliefs/Attitudes: food and nutrition knowledge/skill, beliefs and attitudes  Physical Activity and Function: nutrition-related ADLs and IADLs  Anthropometric Measurements: weight, weight change  Biochemical Data, Medical Tests and Procedures: electrolyte and renal panel, gastrointestinal profile, glucose/endocrine profile, inflammatory profile, lipid profile  Nutrition-Focused Physical Findings: overall appearance, head and eyes, extremities, muscles and bones, skin    Nutrition Risk    Level of Risk:  (F/U 2x/wk)    Nutrition Follow-Up    RD Follow-up?: Yes     Discharge planning: Discharge home with hospice per CM.

## 2017-11-13 NOTE — SUBJECTIVE & OBJECTIVE
Interval History:  No new complaints.  No change in status.    Review of Systems   Constitutional: Negative for chills and fever.   Neurological: Negative for dizziness and tremors.     Objective:     Vital Signs (Most Recent):  Temp: 97.1 °F (36.2 °C) (11/13/17 1608)  Pulse: 89 (11/13/17 1608)  Resp: 18 (11/13/17 1608)  BP: 104/60 (11/13/17 1608)  SpO2: 96 % (11/13/17 1608) Vital Signs (24h Range):  Temp:  [97 °F (36.1 °C)-98.9 °F (37.2 °C)] 97.1 °F (36.2 °C)  Pulse:  [6-100] 89  Resp:  [16-20] 18  SpO2:  [94 %-98 %] 96 %  BP: ()/(57-63) 104/60     Weight: 38.1 kg (83 lb 15.9 oz)  Body mass index is 14.42 kg/m².    Intake/Output Summary (Last 24 hours) at 11/13/17 1725  Last data filed at 11/13/17 0600   Gross per 24 hour   Intake             1310 ml   Output                0 ml   Net             1310 ml      Physical Exam   Constitutional: She is oriented to person, place, and time. Vital signs are normal. She appears cachectic. She is active. She has a sickly appearance. No distress.   Eyes: Conjunctivae and EOM are normal.   Neck: Trachea normal, normal range of motion and full passive range of motion without pain. Neck supple. No JVD present.   Cardiovascular: Normal rate and regular rhythm.  PMI is not displaced.    No murmur heard.  Pulmonary/Chest: Effort normal and breath sounds normal. She exhibits no mass and no tenderness.   Abdominal: Soft. Normal appearance and bowel sounds are normal. There is no hepatosplenomegaly. There is no tenderness.   Musculoskeletal: Normal range of motion. She exhibits no edema.   Neurological: She is alert and oriented to person, place, and time. She has normal strength. No cranial nerve deficit.   Skin: Skin is warm and dry. No rash noted. She is not diaphoretic.   Psychiatric: She has a normal mood and affect. Her speech is normal. She is slowed. Cognition and memory are impaired.       Significant Labs: All pertinent labs within the past 24 hours have been  reviewed.    Significant Imaging: none

## 2017-11-14 VITALS
HEIGHT: 64 IN | BODY MASS INDEX: 14.34 KG/M2 | HEART RATE: 96 BPM | DIASTOLIC BLOOD PRESSURE: 53 MMHG | SYSTOLIC BLOOD PRESSURE: 89 MMHG | WEIGHT: 84 LBS | TEMPERATURE: 98 F | OXYGEN SATURATION: 96 % | RESPIRATION RATE: 18 BRPM

## 2017-11-14 PROBLEM — E83.52 HYPERCALCEMIA: Status: RESOLVED | Noted: 2017-11-10 | Resolved: 2017-11-14

## 2017-11-14 LAB
DIASTOLIC DYSFUNCTION: YES
ESTIMATED PA SYSTOLIC PRESSURE: 11.7
PTH RELATED PROT SERPL-SCNC: 3 PMOL/L
RETIRED EF AND QEF - SEE NOTES: 55 (ref 55–65)
TRICUSPID VALVE REGURGITATION: ABNORMAL

## 2017-11-14 PROCEDURE — 94640 AIRWAY INHALATION TREATMENT: CPT

## 2017-11-14 PROCEDURE — 99900035 HC TECH TIME PER 15 MIN (STAT)

## 2017-11-14 PROCEDURE — 25000242 PHARM REV CODE 250 ALT 637 W/ HCPCS: Performed by: HOSPITALIST

## 2017-11-14 PROCEDURE — 25000003 PHARM REV CODE 250: Performed by: HOSPITALIST

## 2017-11-14 PROCEDURE — 94761 N-INVAS EAR/PLS OXIMETRY MLT: CPT

## 2017-11-14 RX ORDER — BISACODYL 5 MG
10 TABLET, DELAYED RELEASE (ENTERIC COATED) ORAL ONCE
Status: COMPLETED | OUTPATIENT
Start: 2017-11-14 | End: 2017-11-14

## 2017-11-14 RX ORDER — SODIUM CHLORIDE 9 MG/ML
INJECTION, SOLUTION INTRAVENOUS CONTINUOUS
Status: DISCONTINUED | OUTPATIENT
Start: 2017-11-14 | End: 2017-11-14 | Stop reason: HOSPADM

## 2017-11-14 RX ADMIN — ASPIRIN 81 MG CHEWABLE TABLET 81 MG: 81 TABLET CHEWABLE at 08:11

## 2017-11-14 RX ADMIN — FERROUS SULFATE TAB EC 325 MG (65 MG FE EQUIVALENT) 325 MG: 325 (65 FE) TABLET DELAYED RESPONSE at 08:11

## 2017-11-14 RX ADMIN — BISACODYL 10 MG: 5 TABLET, COATED ORAL at 10:11

## 2017-11-14 RX ADMIN — IPRATROPIUM BROMIDE 0.5 MG: 0.5 SOLUTION RESPIRATORY (INHALATION) at 07:11

## 2017-11-14 RX ADMIN — FLUTICASONE FUROATE AND VILANTEROL TRIFENATATE 1 PUFF: 100; 25 POWDER RESPIRATORY (INHALATION) at 07:11

## 2017-11-14 RX ADMIN — SODIUM CHLORIDE: 0.9 INJECTION, SOLUTION INTRAVENOUS at 10:11

## 2017-11-14 NOTE — PROGRESS NOTES
11/13/17 1938   Patient Assessment/Suction   Level of Consciousness (AVPU) alert   Respiratory Effort Normal;Unlabored   Expansion/Accessory Muscles/Retractions no retractions;no use of accessory muscles   All Lung Fields Breath Sounds diminished   Cough Frequency infrequent   PRE-TX-O2-ETCO2   O2 Device (Oxygen Therapy) room air   SpO2 95 %   Pulse 105   Resp 20   Aerosol Therapy   $ Aerosol Therapy Charges Aerosol Treatment   Respiratory Treatment Status given   SVN/Inhaler Treatment Route mask   Position During Treatment HOB at 45 degrees   Patient Tolerance good   Post-Treatment   Post-treatment Heart Rate (beats/min) 102   Post-treatment Resp Rate (breaths/min) 20   All Fields Breath Sounds unchanged

## 2017-11-14 NOTE — PLAN OF CARE
D/C pt per order. DC instructions given. PIV removed, catheter intact. Pt tolerated well. All belonging sent home with pt. Transferred off unit via WC by staff and family. Stable condition.

## 2017-11-14 NOTE — PLAN OF CARE
11/14/17 1310   Final Note   Assessment Type Final Discharge Note   Discharge Disposition HospiceHome  ([facility])

## 2017-11-14 NOTE — PLAN OF CARE
Problem: Patient Care Overview  Goal: Plan of Care Review  Outcome: Ongoing (interventions implemented as appropriate)  Patient AAO, VSS.  Patient up multiple times to the restroom this shift.  Bed alarm on.  Patient refuses to wait for call light to be answered.  No reports of pain.  Patient free from falls and injury during shift.  Bed in lowest position, brakes locked, and call light within reach.  Will continue to monitor.

## 2017-11-14 NOTE — PLAN OF CARE
11/14/17 0743   Patient Assessment/Suction   Level of Consciousness (AVPU) alert   Respiratory Effort Normal;Unlabored   Expansion/Accessory Muscles/Retractions no retractions;no use of accessory muscles   All Lung Fields Breath Sounds diminished;clear   Rhythm/Pattern, Respiratory depth regular;pattern regular;unlabored   Cough Frequency infrequent   Cough Type good;nonproductive   PRE-TX-O2-ETCO2   O2 Device (Oxygen Therapy) room air   SpO2 96 %   Pulse Oximetry Type Intermittent   $ Pulse Oximetry - Multiple Charge Pulse Oximetry - Multiple   Pulse 105   Resp 16   Positioning Sitting in bed   Aerosol Therapy   $ Aerosol Therapy Charges Aerosol Treatment   Respiratory Treatment Status given   SVN/Inhaler Treatment Route mask   Position During Treatment Sitting in bed   Patient Tolerance good   Inhaler   $ Inhaler Charges MDI (Metered Dose Inahler) Treatment   Respiratory Treatment Status given   SVN/Inhaler Treatment Route mouthpiece   Patient Tolerance good   Post-Treatment   Post-treatment Heart Rate (beats/min) 101   Post-treatment Resp Rate (breaths/min) 20   All Fields Breath Sounds unchanged

## 2017-11-14 NOTE — PLAN OF CARE
Javier received a call from Elias with [facility], still waiting on family to decide on the Hospice Care.    11:55 Cm received a call from Radha with [facility], family has chosen [facility] for Hospice Care.  Pt will live with son Juan [phone] at [address] [place] [address] [address] [place] [address].  Javier notified Elias with [facility] and sent a message via [facility].  Javier notified Dr. Min for orders.       11/14/17 1100   Discharge Reassessment   Assessment Type Discharge Planning Reassessment   Provided patient/caregiver education on the expected discharge date and the discharge plan Yes   Do you have any problems affording any of your prescribed medications? No   Discharge Plan A Home with family;Hospice/home

## 2017-11-14 NOTE — PLAN OF CARE
Javier spoke with Radha at [facility], all the medication and equipment are at pt's house.  Javier spoke with Destiny, RN-Charge Nurse and she informed son Juan.  Javier sent the following inforamtoin via [facility]:  MAR, D/C ORDERS AND AVS.  Pt will discharge to home with son-Juan.       11/14/17 6924   Discharge Reassessment   Assessment Type Discharge Planning Reassessment   Provided patient/caregiver education on the expected discharge date and the discharge plan Yes   Do you have any problems affording any of your prescribed medications? No   Discharge Plan A Home with family;Hospice/home

## 2017-11-15 ENCOUNTER — TELEPHONE (OUTPATIENT)
Dept: MEDSURG UNIT | Facility: HOSPITAL | Age: 82
End: 2017-11-15

## 2017-11-15 NOTE — PROGRESS NOTES
CHIEF COMPLAINT:  weakness      HISTORY OF PRESENT ILLNESS:  Lola Frazier is a 85 y.o. female who presents to clinic for an UC visit for evaluation of weakness, dizziness, decreased PO intake. She is accompanied by her Son Jose. He states that for the last week she has only been drinking endure, has not eaten any food. She complains of feeling weak and dizzy. She had a right lung mass which is likely malignant, but she has not wished to have any further evaluation of this.     REVIEW OF SYSTEMS:  The patient denies any fever, chills, night sweats, headaches, vision changes, difficulty speaking or swallowing, decreased hearing, , chest pain, palpitations, shortness of breath, cough, nausea, vomiting, abdominal pain, dysuria, diarrhea, constipation, hematuria, hematochezia, melena, changes in her hair, skin, nails, numbness or weakness in her extremities, erythema, pain or swelling over any of her joints, myalgia, swollen glands, easy bruising, fatigue, edema, symptoms of anxiety or depression.       MEDICATIONS:   Reviewed and/or reconciled in EPIC    ALLERGIES:  Reviewed and/or reconciled in [facility]    PAST MEDICAL/SURGICAL HISTORY:   Past Medical History:   Diagnosis Date    Blindness of left eye     Cataract     COPD (chronic obstructive pulmonary disease)     Glaucoma     Hyperlipemia       Past Surgical History:   Procedure Laterality Date    CATARACT EXTRACTION Bilateral     HYSTERECTOMY         FAMILY HISTORY:    Family History   Problem Relation Age of Onset    No Known Problems Mother     Heart attack Father     Cancer Neg Hx     Heart disease Neg Hx        SOCIAL HISTORY:    Social History     Social History    Marital status:      Spouse name: N/A    Number of children: N/A    Years of education: N/A     Occupational History    Not on file.     Social History Main Topics    Smoking status: Former Smoker     Packs/day: 0.25     Years: 66.00     Types: Cigarettes     Quit date:  "10/10/2017    Smokeless tobacco: Never Used      Comment: down to 2 cigatettes a day (8/22/16)    Alcohol use No    Drug use: No    Sexual activity: Not Currently     Partners: Male     Other Topics Concern    Not on file     Social History Narrative    No narrative on file       PHYSICAL EXAM:  VITAL SIGNS:   Vitals:    11/10/17 1041   BP: (!) 81/59   BP Location: Left arm   Patient Position: Sitting   BP Method: Small (Automatic)   Pulse: (!) 121   Resp: (!) 28   Temp: 97.5 °F (36.4 °C)   TempSrc: Oral   SpO2: (!) 86%   Weight: 38.2 kg (84 lb 3.5 oz)   Height: 5' 4" (1.626 m)     GENERAL:  Patient appears well nourished, laying in exam table, weak.  HEENT:  Atraumatic, normocephalic, PERRLA, EOMI, no conjunctival injection, sclerae are anicteric, normal external auditory canals,TMs clear b/l, gross hearing intact to whisper, MMM, no oropharygneal erythema or exudate.  NECK:  Supple, normal ROM, trachea is midline , no supraclavicular or cervical LAD or masses palpated.  Thyroid gland not palpable.  CARDIOVASCULAR:  Regular rhythm, tachycardic, normal S1 and S2, no m/r/g.  RESPIRATORY:  CTA b/l, no wheezes, rhonchi, rales.  No increased work of breathing, no  use of accessory muscles.  ABDOMEN:  Soft, nontender, nondistended, normoactive bowel sounds in all four quadrants, no rebound or guarding, no HSM or masses palpated.  Normal percussion.  EXTREMITIES:  2+ DP pulses b/l, no edema.  SKIN:  Warm, no lesions on exposed skin.  NEUROMUSCULAR:  Cranial nerves II-XII grossly intact.   No clubbing or cyanosis of digits/nails.  Steady gait.  PSYCH:  Patient is alert and oriented to person, time, place. They are appropriately dressed and groomed. There is normal eye contact. Rate and tone of speech is normal. Normal insight, judgement. Normal thought content and process.         ASSESSMENT/PLAN: This is a 85 y.o. female who presents to clinic for evaluation of weakness   1. Dehydration, tachycardia, hypoxia: " Patient and her son elect to go to the ER for evaluation and possible admission with transfer to hospice. They will transport her by private vehicle. Sign out was given to ED staff.           Yara Meléndez MD

## 2017-11-15 NOTE — DISCHARGE SUMMARY
[facility] Hospital Medicine  Discharge Summary      Patient Name: Lola Frazier  MRN: [id number]  Admission Date: 11/10/2017  Hospital Length of Stay: 4 days  Discharge Date and Time: 11/14/2017  1:40 PM  Attending Physician: No att. providers found   Discharging Provider: Theodore Sousa MD  Primary Care Provider: Yara Meléndez MD        HPI: Late last year, she underwent imaging of her chest, which showed a spiculated mass in her right lung.  At the time, she didn't want any further testing or biopsy.  Didn't want any treatment for it.  Lives by herself.  Family brought her to Dr. Meléndez today.  For the past month or two, she's anorexic, sleeping all the time, has no energy, and is depressed.  Finds no marilu in things she used to like to do, such as sewing.  Has lost a lot of weight.  In [facility] office, patient was hypotensive and hypoxic.  Was referred to our ER.  No fevers or chills.  Gets short of breath with exertion.  Has chronic pains in her back.    * No surgery found *      Hospital Course:   She was admitted with hypercalcemia.  She was given an injection of Aredia and continuous saline infusion.  The calcium level came down to normal.  CT chest showed the mass to be 6.7 x 4.0 x 6.6 cm (previous scan had it at 4.5 x 3.2).  PTH was within normal range; the PTH-related protein was elevated at 3.0.  We consulted with pulmonologist.  He said the mass was almost certainly lung cancer.  He felt the pt was too frail to consider biopsying and too weak to give chemotherapy.  Patient and family decided to leave it alone and to let the cancer take its course.  She went home and was set up with hospice services through [facility].    PE:  Constitutional: She is oriented to person, place, and time. Vital signs are normal. She appears cachectic. She is active. She has a sickly appearance. No distress.   Eyes: Conjunctivae and EOM are normal.   Neck: Trachea normal, normal range of motion and full  passive range of motion without pain. Neck supple. No JVD present.   Cardiovascular: Normal rate and regular rhythm.  PMI is not displaced.    No murmur heard.  Pulmonary/Chest: Effort normal and breath sounds normal. She exhibits no mass and no tenderness.   Abdominal: Soft. Normal appearance and bowel sounds are normal. There is no hepatosplenomegaly. There is no tenderness.   Musculoskeletal: Normal range of motion. She exhibits no edema.   Neurological: She is alert and oriented to person, place, and time. She has normal strength. No cranial nerve deficit.       Consults:   Consults         Status Ordering Provider     Inpatient consult to Pulmonology  Once     Provider:  Deon Jiménez MD    Completed MARTHA MICHAEL     IP consult to dietary  Once     Provider:  (Not yet assigned)    Completed MARTHA MICHAEL          Final Active Diagnoses:    Diagnosis Date Noted POA    PRINCIPAL PROBLEM:  Mass of right lung [R91.8] 11/10/2017 Yes    Severe malnutrition [E43] 11/10/2017 Yes      Problems Resolved During this Admission:    Diagnosis Date Noted Date Resolved POA    Hypotension [I95.9] 11/12/2017 11/13/2017 No    Hypercalcemia [E83.52] 11/10/2017 11/14/2017 Yes      Discharged Condition: good    Disposition: Hospice/Home    Follow Up:  Follow-up Information     [facility].    Specialty:  Hospice Services  Contact information:  [address] [address] [address] [phone]                 Patient Instructions:     Diet general     Activity as tolerated       Medications:  Reconciled Home Medications:   Discharge Medication List as of 11/14/2017  1:09 PM      CONTINUE these medications which have NOT CHANGED    Details   albuterol (ACCUNEB) 0.63 mg/3 mL Nebu Take 3 mLs (0.63 mg total) by nebulization every 6 (six) hours as needed., Starting 8/16/2016, Until Discontinued, Normal      ascorbic acid, vitamin C, (VITAMIN C) 500 MG tablet Take 500 mg by mouth once daily., Until  Discontinued, Historical Med      aspirin 81 MG Chew Take 1 tablet (81 mg total) by mouth once daily., Starting Fri 7/22/2016, Until Fri 11/10/2017, Print      atorvastatin (LIPITOR) 10 MG tablet take 1 tablet by mouth once daily, Print      b complex vitamins tablet Take 1 tablet by mouth once daily., Until Discontinued, Historical Med      calcium carbonate (OS-TANYA) 600 mg (1,500 mg) Tab Take 600 mg by mouth once daily., Until Discontinued, Historical Med      ferrous sulfate 325 mg (65 mg iron) Tab tablet Take 325 mg by mouth once daily., Until Discontinued, Historical Med      fish oil-omega-3 fatty acids 300-1,000 mg capsule Take 2 g by mouth once daily., Until Discontinued, Historical Med      fluticasone-salmeterol 100-50 mcg/dose (ADVAIR) 100-50 mcg/dose diskus inhaler Inhale 1 puff into the lungs 2 (two) times daily., Starting 1/17/2017, Until Discontinued, Normal      glucosamine-chondroitin 500-400 mg tablet Take 1 tablet by mouth 2 (two) times daily. , Until Discontinued, Historical Med      ipratropium (ATROVENT) 0.02 % nebulizer solution Take 2.5 mLs (500 mcg total) by nebulization 3 (three) times daily as needed for Wheezing., Starting Thu 9/15/2016, Until Fri 11/10/2017, Normal      LUTEIN ORAL Take 1 tablet by mouth once daily., Until Discontinued, Historical Med      mometasone (NASONEX) 50 mcg/actuation nasal spray 2 sprays by Nasal route daily as needed., Starting 1/23/2017, Until Discontinued, Print      SIMBRINZA 1-0.2 % DrpS Starting 12/31/2015, Until Discontinued, Historical Med      tafluprost, PF, 0.0015 % Dpet Apply to eye., Until Discontinued, Historical Med      tiotropium (SPIRIVA) 18 mcg inhalation capsule Inhale 1 capsule (18 mcg total) into the lungs once daily., Starting 1/17/2017, Until Discontinued, Normal      VIT A/VIT C/VIT E/ZINC/COPPER (PRESERVISION AREDS ORAL) Take 1 tablet by mouth 2 (two) times daily. , Until Discontinued, Historical Med             Significant Diagnostic  Studies:   BMP  Lab Results   Component Value Date     11/13/2017    K 4.3 11/13/2017     11/13/2017    CO2 26 11/13/2017    BUN 5 (L) 11/13/2017    CREATININE 0.6 11/13/2017    CALCIUM 9.1 11/13/2017    ANIONGAP 8 11/13/2017    ESTGFRAFRICA >60 11/13/2017    EGFRNONAA >60 11/13/2017     Lab Results   Component Value Date    WBC 11.40 11/12/2017    HGB 11.8 (L) 11/12/2017    HCT 35.8 (L) 11/12/2017    MCV 95 11/12/2017     11/12/2017     Lab Results   Component Value Date    PTH 12.9 11/11/2017    CALCIUM 9.1 11/13/2017     PTH-Related protein --   3.0 pmol/L --   11/11/2017      Pending Diagnostic Studies:     None        Indwelling Lines/Drains at time of discharge:   Lines/Drains/Airways          No matching active lines, drains, or airways          Time spent on the discharge of patient: 32 minutes  Patient was seen and examined on the date of discharge and determined to be suitable for discharge.         Theodore Sousa MD  Department of Hospital Medicine  [facility]